# Patient Record
Sex: MALE | Race: OTHER | NOT HISPANIC OR LATINO | Employment: FULL TIME | URBAN - METROPOLITAN AREA
[De-identification: names, ages, dates, MRNs, and addresses within clinical notes are randomized per-mention and may not be internally consistent; named-entity substitution may affect disease eponyms.]

---

## 2019-07-20 DIAGNOSIS — M54.50 LOW BACK PAIN: Primary | ICD-10-CM

## 2019-07-30 PROBLEM — M25.512 PAIN IN JOINT OF LEFT SHOULDER: Status: ACTIVE | Noted: 2019-07-30

## 2019-08-06 ENCOUNTER — CLINICAL SUPPORT (OUTPATIENT)
Dept: REHABILITATION | Facility: HOSPITAL | Age: 44
End: 2019-08-06
Payer: MEDICAID

## 2019-08-06 PROCEDURE — 97110 THERAPEUTIC EXERCISES: CPT

## 2019-08-06 PROCEDURE — 97140 MANUAL THERAPY 1/> REGIONS: CPT

## 2019-08-06 NOTE — PROGRESS NOTES
"  Physical Therapy Daily Treatment Note     Name: Dennis Oden  Clinic Number: [id number]    Therapy Diagnosis: Pain in L shoulder    Physician: Javan Alcocer Jr.,*    Visit Date: 8/6/2019    Physician Orders: eval and treat  Medical Diagnosis: Pain in L shoulder   Evaluation Date: 7/3/19  Authorization Period Expiration:   Plan of Care Certification Period: 7/3/19 - 8/14/19  Visit #/Visits authorized:     Time In:10:00  Time Out: 11:00  Total Billable Time: 60 minutes    Precautions: Standard    Subjective     Pt reports: Minimal pain in L shoulder. "It's not really effecting my life much."   Response to previous treatment: good   Functional change: ADLs without pain.     Pain: 0/10  Location: left shoulder      Objective     Dennis received therapeutic exercises to develop strength, endurance, flexibility and posture for 45 minutes including:    ·   Shoulder - Pulley - Flexion 3 minutes  ·   Shoulder - Pulley - Abduction 3 minutes  ·   Shoulder - Scapula - Ball on Wall (scap Stab) 30 seconds ea: up/down, R/L, CW/CCW  ·   Shoulder - Tband - ROW 2 x 10  ·   Shoulder - Pec Stretch 3x 30 seconds; doorway  ·   C-Spine - Upper Trap  Stretch 3x 30 second holds each  ·   C-Spine - Levator Scap Stretch 3x 30 second holds each  ·   Shoulder - Scapular Retraction 2 x 10 with 5 second holds  ·   Shoulder - Serratus Punch -Supine   2# 2 x 10    · Dennis received the following manual therapy techniques: Joint mobilizations and Soft tissue Mobilization were applied to the: musculature of L shoulder/scapular region for 10 minutes, including:STM to long head of biceps, upper trap rhomboids, suboccipital and cervical paraspinals; Grade III A/P and inferior glide to L GHJ        Dennis received cold pack for 10 minutes to L shoulder following TE and MT.        Assessment     Pt tolerated resuming of TE without onset of painful symptoms. Moderate muscle tightness noted in distal pec region and proximal biceps region. Soft " tissues responded well to manual therapy with improved pliability following STM.      Dennis is progressing well towards his goals.   Pt prognosis is Good.     Pt will continue to benefit from skilled outpatient physical therapy to address the deficits listed in the problem list box on initial evaluation, provide pt/family education and to maximize pt's level of independence in the home and community environment.     Pt's spiritual, cultural and educational needs considered and pt agreeable to plan of care and goals.     Anticipated barriers to physical therapy: none identified     Goals: Short Term Goals  Goal   1. Patient will be independent with HEP to promote improved functional outcomes during therapy in 6 visits.   2. Patient will report decreased pain in L shoulder with the worst pain no more than 5/10 to demonstrate an improved quality of life in 6 visits.   3. Patient will decrease LOLA score from 27% disability to 18% disability for an improvement in functional abilities in 6 visits.     Long Term Goals  Goal   4. Patient will increase strength in L shoulder External rotators, rhomboids, mid and lower traps to 4/5 to demonstrate improved postural control in 12 visits.   5. Patient will report decreased L shoulder pain to </=2/10 at worst to demonstrate an improved quality of life in 12 visits.   6. Patient will decrease LOLA score from 27% disability at IE to </= 10% disability to return to sparring activities in 12 visits.       Plan     Continue current POC advancing as tolerated.     Jazmin Bueno, PTA

## 2019-08-08 ENCOUNTER — CLINICAL SUPPORT (OUTPATIENT)
Dept: REHABILITATION | Facility: HOSPITAL | Age: 44
End: 2019-08-08
Payer: MEDICAID

## 2019-08-08 DIAGNOSIS — M25.512 PAIN IN JOINT OF LEFT SHOULDER: ICD-10-CM

## 2019-08-08 DIAGNOSIS — M25.512 ACUTE PAIN OF LEFT SHOULDER: Primary | ICD-10-CM

## 2019-08-08 PROCEDURE — 97110 THERAPEUTIC EXERCISES: CPT

## 2019-08-08 NOTE — PROGRESS NOTES
Physical Therapy Daily Treatment Note     Name: Dennis Oden  Clinic Number: [id number]    Therapy Diagnosis: Pain in L shoulder    Physician: Javan Alcocer Jr.,*    Visit Date: 8/8/2019    Physician Orders: eval and treat  Medical Diagnosis: Pain in L shoulder   Evaluation Date: 7/3/19  Authorization Period Expiration: TBD  Plan of Care Certification Period: 7/3/19 - 8/14/19  Visit #/Visits authorized: 6/TBD    Monthly    Time In:10:05  Time Out: 11:00  Total Billable Time: 55 minutes    Precautions: Standard    Subjective     Pt reports: no pain in shoulder during his week off from therapy but states after last treatment, he experienced an increase in pain. He states that he can perform his usual activities without issue, difficulty or increase in pain.  He verbalized that he has difficulty quantifying pain and answering Quick DASH with the numerical scale   Response to previous treatment: good but noted some increase in pain after Tuesday's treatment.   Functional change: ADLs without pain.     Pain: 0/10  Location: left shoulder      Objective     Dennis received therapeutic exercises to develop strength, endurance, flexibility and posture for 45 minutes including:    ·   Shoulder - Pulley - Flexion 3 minutes  ·   Shoulder - Pulley - Abduction 3 minutes  ·   Shoulder - Scapula - Ball on Wall (scap Stab) 30 seconds ea: up/down, R/L, CW/CCW  ·   Shoulder - Tband - ROW with hold 2 x 10 with 5 second hold  ·   Shoulder - Pec Stretch 3x 30 seconds; doorway  ·   C-Spine - Upper Trap  Stretch 2x 30 second holds each  ·   C-Spine - Levator Scap Stretch 2x 30 second holds each  ·   Shoulder - Serratus Punch -Supine   3# 2 x 10  Push up plus 2x 10  Posterior capsule stretch 2x 30 seconds  Prayer stretch 2x 30 seconds    Not Performed at today's visit:  Scapular Retraction 2 x 10 with 5 second holds    Dennis received a monthly Reassessment of goals for 10 minutes including MMT, and QuickDASH questionnaire  :  Muscle Right Strength Left Strength   Shoulder Flexors 4/5 4/5 (pain)   Shoulder extensors 5/5 5/5 (Pain)   Shoulder abductors 4/5 4/5 (pain)   Shoulder internal rotators 5/5 5/5   shoulder External Rotators 5/5 5/5   Elbow flexors 5/5 5/5 (pain)   Elbow extensors 4+/5 5/5 (pain)   Middle Trapezius 4/5 4/5   Lower Trapezius 4/5 4/5   Rhomboids 4/5 4/5     Patient improved B internal rotator strength and R ER strength from 4/5 to 5/5 and L external rotator strength from 3+/5 to 5/5 at today's visit.  B Middle trap, lower trap and rhomboids increased from 3/5 to 4/5 at todays visit.     Quick DASH questionnaire provided to pt at IE and today.  Score for today indicated 29.5% disability.    Education was provided on new exercises including posterior capsule stretch, prayer stretch, and push-up plus as well as the treatment and responses from last visit.       Assessment     At monthly re-assessment, patient has made little progress.  He did miss a week of therapy and stated that he felt really good without increase in pain with ADLs and boxing/exercises.  Patient reported his pain at the worst over the past few weeks as 4/10 to meet goal 2 and make progress towards goal 5 as well as increasing MMT strength to meet goal 4. . Patient states he did not receive a copy of stretches and exercises to perform at home and was provided with one at today's visit to address goal 1.  Patient verbalized difficulty with quantifying pain and filling out QuickDASH as they relate to using a numerical scale which may be why patient's score increased at today's visit from 27% disability at IE to 29.5% disability.  When the scale was explained to him, he stated that he doesn't have difficulty completing tasks and activities but sometimes he experiences pain when he performs them.       Dennis is progressing well towards his goals.   Pt prognosis is Good.     Pt will continue to benefit from skilled outpatient physical therapy to  address the deficits listed in the problem list box on initial evaluation, provide pt/family education and to maximize pt's level of independence in the home and community environment.     Pt's spiritual, cultural and educational needs considered and pt agreeable to plan of care and goals.     Anticipated barriers to physical therapy: patient has difficulty with verbally quantifying pain and activity performance limitations as a result of pain.      Goals:     Length Status Goal   Short Term 08/08/2019: In progress 1. Patient will be independent with HEP to promote improved functional outcomes during therapy in 6 visits.   Short Term 08/08/2019: MET 2. Patient will report decreased pain in L shoulder with the worst pain no more than 5/10 to demonstrate an improved quality of life in 6 visits.   Short Term 08/08/2019: Addressed 3. Patient will decrease Quick DASH score from 27% disability to 18% disability for an improvement in functional abilities in 6 visits.   Long Term 08/08/2019: MET 4. Patient will increase strength in L shoulder External rotators, rhomboids, mid and lower traps to 4/5 to demonstrate improved postural control in 12 visits.   Long Term 08/08/2019:In progress 5. Patient will report decreased L shoulder pain to </=2/10 at worst to demonstrate an improved quality of life in 12 visits.   Long Term 08/08/2019: In progress 6. Patient will decrease Quick DASH score from 27% disability at IE to </= 10% disability to return to sparring activities in 12 visits.         Plan     Continue current POC advancing as tolerated.     Muna Salgado, PT

## 2019-08-08 NOTE — PATIENT INSTRUCTIONS
PT Home Exercise Plan  VIANCA PRATER  Report Generation Date: 07/16/2019    Upper Trap Stretch Levator Scapula Stretch Scapular Retractions        Gently grasp side of head while reaching behind back with other hand.  Tilt head away until a gentle stretch is felt.  Hold ___ seconds.  Repeat ___ times, both sides.  DO ___ times per day. Place hand on same side of shoulder blade.  With other hand gently stretch head down and away.  Hold ___ seconds.  Repeat ____ reps.  Do ___ sets.  Do ___ sessions per day. Squeeze shoulder blades together.  Hold ___ seconds.  Repeat ___ times.  Do ___ sets. Perform __ sessions per day.   Hold 10 seconds repeat 5 times Hold 10 seconds repeat 5 times. 2 x 10

## 2019-08-13 ENCOUNTER — CLINICAL SUPPORT (OUTPATIENT)
Dept: REHABILITATION | Facility: HOSPITAL | Age: 44
End: 2019-08-13
Payer: MEDICAID

## 2019-08-13 DIAGNOSIS — M25.512 PAIN IN JOINT OF LEFT SHOULDER: Primary | ICD-10-CM

## 2019-08-13 PROCEDURE — 97110 THERAPEUTIC EXERCISES: CPT

## 2019-08-13 NOTE — PROGRESS NOTES
Physical Therapy Daily Treatment Note     Name: Dennis Oden  Clinic Number: [id number]    Therapy Diagnosis: Pain in L shoulder    Physician: Javan Alcocer Jr.,*    Visit Date: 8/13/2019    Physician Orders: eval and treat  Medical Diagnosis: Pain in L shoulder   Evaluation Date: 7/3/19  Authorization Period Expiration: TBD  Plan of Care Certification Period: 7/3/19 - 8/14/19  Visit #/Visits authorized: 7/TBD    Time In:10:00 am  Time Out: 11:10 am  Total Billable Time: 60 minutes    Precautions: Standard    Subjective     Pt reports: sometimes he feels the pain but not all the time.   Response to previous treatment: no complaints after last treatment  Functional change: ADLs without pain.     Pain: 0/10  Location: left shoulder      Objective     Dennis received therapeutic exercises to develop strength, endurance, flexibility and posture for 58 minutes including:  Pulley - flexion 3 mins  Pulley abduction 3 minutes  Ball on wall (scap satbilization) - 2x 30 seconds each: up/craig, R/L, CW/CWW  Cybex Lat Pull down 2x 10 with 5 plates  Cybex Row 2x 10 with 4 plates  I, T, Y's with pink theraband 1x 10  Push up plus 3x 10  Pec Stretch (doorway) 3x 30 second holds  Shoulder - Serratus Punch -Supine   3# 3x 10  Posterior capsule stretch 2x 30 seconds  Prayer stretch 3x 30 seconds  UBE 6 minutes    Not Performed at today's visit:  Scapular Retraction 2 x 10 with 5 second holds  Tband - ROW with hold 2 x 10 with 5 second hold  Levator scap stretch 2x 30 second hold  Upper trap 2x 30 second hold  (add) flexion in standing/supine with weight  (add) ER in side lying with weight      Patient received MHP for 10 minutes to L shoulder for pain relief after treatment in sitting.     Education was provided on new exercises.     Assessment     Patient continues to note decreased pain with activities. He has difficulty verbalizing pain on numerical scale.  Minimal cues for prayer stretch and serratus punches at today's  visit.    Patient tolerated all new exercises well today without incident but required tactile cueing for correct performance of I, T, Y's to target appropriate muscles.       Dennis is progressing well towards his goals.   Pt prognosis is Good.     Pt will continue to benefit from skilled outpatient physical therapy to address the deficits listed in the problem list box on initial evaluation, provide pt/family education and to maximize pt's level of independence in the home and community environment.     Pt's spiritual, cultural and educational needs considered and pt agreeable to plan of care and goals.     Anticipated barriers to physical therapy: patient has difficulty with verbally quantifying pain and activity performance limitations as a result of pain.      Goals:     Length Status Goal   Short Term 08/08/2019: In progress 1. Patient will be independent with HEP to promote improved functional outcomes during therapy in 6 visits.   Short Term 08/08/2019: MET 2. Patient will report decreased pain in L shoulder with the worst pain no more than 5/10 to demonstrate an improved quality of life in 6 visits.   Short Term 08/08/2019: Addressed 3. Patient will decrease Quick DASH score from 27% disability to 18% disability for an improvement in functional abilities in 6 visits.   Long Term 08/08/2019: MET 4. Patient will increase strength in L shoulder External rotators, rhomboids, mid and lower traps to 4/5 to demonstrate improved postural control in 12 visits.   Long Term 08/08/2019:In progress 5. Patient will report decreased L shoulder pain to </=2/10 at worst to demonstrate an improved quality of life in 12 visits.   Long Term 08/08/2019: In progress 6. Patient will decrease Quick DASH score from 27% disability at IE to </= 10% disability to return to sparring activities in 12 visits.         Plan     Continue current POC advancing as tolerated. At next visit, look at adding theraband for resistance with push  up plus exercise as well as new exercises for flexion and ER.     Muna Salgado, PT

## 2019-08-15 ENCOUNTER — CLINICAL SUPPORT (OUTPATIENT)
Dept: REHABILITATION | Facility: HOSPITAL | Age: 44
End: 2019-08-15
Payer: MEDICAID

## 2019-08-15 DIAGNOSIS — M25.512 PAIN IN JOINT OF LEFT SHOULDER: ICD-10-CM

## 2019-08-15 PROCEDURE — 97110 THERAPEUTIC EXERCISES: CPT

## 2019-08-15 NOTE — PROGRESS NOTES
Physical Therapy Daily Treatment Note     Name: Dennis Oden  Clinic Number: [id number]    Therapy Diagnosis: Pain in L shoulder    Physician: Javan Alcocer Jr.,*    Visit Date: 8/15/2019    Physician Orders: eval and treat  Medical Diagnosis: Pain in L shoulder   Evaluation Date: 7/3/19  Authorization Period Expiration: TBD  Plan of Care Certification Period: 7/3/19 - 8/14/19  Visit #/Visits authorized: 8/TBD    Time In:10:00 am  Time Out: 11:10 am  Total Billable Time: 60 minutes    Precautions: Standard    Subjective     Pt reports: no pain this week.   Response to previous treatment: no complaints after last treatment  Functional change: ADLs without pain.     Pain: 0/10  Location: left shoulder      Objective     Dennis received therapeutic exercises to develop strength, endurance, flexibility and posture for 60 minutes including:  UBE 6 minutes   Pulley - flexion 3 mins  Pulley abduction 3 minutes  Ball on wall (scap satbilization) - 1x 1 minute each: up/craig, R/L, CW/CWW  Cybex Lat Pull down wide and reverse  3x 10 with 5 plates  Cybex Row 2x 10 with 4 plates  I, T, Y's with pink theraband 1x 10  Sidelying ER with 2# weight 2x 10  Flexion in supine with 2# weight 2x 10    Not Performed at today's visit:  Scapular Retraction 2 x 10 with 5 second holds  Tband - ROW with hold 2 x 10 with 5 second hold  Levator scap stretch 2x 30 second hold  Upper trap 2x 30 second hold  Pec Stretch (doorway) 3x 30 second holds      Patient received MHP for 10 minutes to L shoulder for pain relief after treatment in sitting.     Education was provided on new exercises, d/c, compliance with HEP after d/c and heating pad for home use.     Assessment     At last monthly reassessment and today, patient has met goals 1, 2, 4, and 5 while some progress was made towards goals 3 and 6.  He does have difficulty quanitfying pain as it relates to his shoulder and activities which may be one reason goals 3 and 4 were not met.   Patient continues to progress well with exercises and has no complaints of pain with previous exercises.  He tolerated all new exercises well noting muscle soreness on R with external rotation but none of his pain in the L.  Cueing for decreasing upper trap compensation is minimal and patient is able to self correct.  He notes his shoulder is not bothering him all the time/every day.  He does state he notices more pain when he is cold and at night but it is relieved when he gets warm.  He was educated on continued performance of HEP as well as using a heating pad at home over his shoulder when he does feel pain. Patient is discharged at today's visit as all goals were re-assessed last week and patient is not having difficulty with any of his daily activities, workouts or job responsibilities.       Dennis is progressing well towards his goals.   Pt prognosis is Good.       Pt's spiritual, cultural and educational needs considered and pt agreeable to plan of care and goals.     Anticipated barriers to physical therapy: patient has difficulty with verbally quantifying pain and activity performance limitations as a result of pain.      Goals:     Length Status Goal   Short Term 08/15/2019: MET 1. Patient will be independent with HEP to promote improved functional outcomes during therapy in 6 visits.   Short Term 08/08/2019: MET 2. Patient will report decreased pain in L shoulder with the worst pain no more than 5/10 to demonstrate an improved quality of life in 6 visits.   Short Term 08/08/2019: Some Progress 3. Patient will decrease Quick DASH score from 27% disability to 18% disability for an improvement in functional abilities in 6 visits.   Long Term 08/08/2019: MET 4. Patient will increase strength in L shoulder External rotators, rhomboids, mid and lower traps to 4/5 to demonstrate improved postural control in 12 visits.   Long Term 08/15/2019: MET 5. Patient will report decreased L shoulder pain to </=2/10 at  worst to demonstrate an improved quality of life in 12 visits.   Long Term 08/08/2019: Some progress 6. Patient will decrease Quick DASH score from 27% disability at IE to </= 10% disability to return to sparring activities in 12 visits.         Plan     Patient is discharged at today's visit at pt request and due to no pain/difficulty with ADLs and work activities and meeting many of his goals.  He is discharged with verbal confirmation to continue with HEP.      Muna Salgado, PT

## 2019-10-22 ENCOUNTER — HOSPITAL ENCOUNTER (EMERGENCY)
Facility: HOSPITAL | Age: 44
Discharge: HOME | End: 2019-10-22
Attending: EMERGENCY MEDICINE
Payer: MEDICAID

## 2019-10-22 VITALS
OXYGEN SATURATION: 99 % | TEMPERATURE: 98 F | WEIGHT: 195 LBS | RESPIRATION RATE: 20 BRPM | HEIGHT: 68 IN | HEART RATE: 60 BPM | DIASTOLIC BLOOD PRESSURE: 76 MMHG | BODY MASS INDEX: 29.55 KG/M2 | SYSTOLIC BLOOD PRESSURE: 123 MMHG

## 2019-10-22 DIAGNOSIS — R07.9 CHEST PAIN: ICD-10-CM

## 2019-10-22 DIAGNOSIS — M54.6 ACUTE BILATERAL THORACIC BACK PAIN: Primary | ICD-10-CM

## 2019-10-22 DIAGNOSIS — M54.9 BACK PAIN: ICD-10-CM

## 2019-10-22 LAB
ANION GAP SERPL CALC-SCNC: 7 MMOL/L (ref 8–16)
BASOPHILS # BLD AUTO: 0.05 K/UL (ref 0–0.2)
BASOPHILS NFR BLD: 0.9 % (ref 0–1.9)
BUN SERPL-MCNC: 21 MG/DL (ref 6–20)
CALCIUM SERPL-MCNC: 9.4 MG/DL (ref 8.7–10.5)
CHLORIDE SERPL-SCNC: 108 MMOL/L (ref 95–110)
CK SERPL-CCNC: 415 U/L (ref 20–200)
CO2 SERPL-SCNC: 24 MMOL/L (ref 23–29)
CREAT SERPL-MCNC: 0.9 MG/DL (ref 0.5–1.4)
DIFFERENTIAL METHOD: ABNORMAL
EOSINOPHIL # BLD AUTO: 0.4 K/UL (ref 0–0.5)
EOSINOPHIL NFR BLD: 6.6 % (ref 0–8)
ERYTHROCYTE [DISTWIDTH] IN BLOOD BY AUTOMATED COUNT: 11.9 % (ref 11.5–14.5)
ERYTHROCYTE [SEDIMENTATION RATE] IN BLOOD BY WESTERGREN METHOD: 12 MM/HR (ref 0–10)
EST. GFR  (AFRICAN AMERICAN): >60 ML/MIN/1.73 M^2
EST. GFR  (NON AFRICAN AMERICAN): >60 ML/MIN/1.73 M^2
GLUCOSE SERPL-MCNC: 109 MG/DL (ref 70–110)
HCT VFR BLD AUTO: 38.5 % (ref 40–54)
HGB BLD-MCNC: 13.5 G/DL (ref 14–18)
IMM GRANULOCYTES # BLD AUTO: 0 K/UL (ref 0–0.04)
IMM GRANULOCYTES NFR BLD AUTO: 0 % (ref 0–0.5)
LYMPHOCYTES # BLD AUTO: 2.4 K/UL (ref 1–4.8)
LYMPHOCYTES NFR BLD: 41.8 % (ref 18–48)
MCH RBC QN AUTO: 31.3 PG (ref 27–31)
MCHC RBC AUTO-ENTMCNC: 35.1 G/DL (ref 32–36)
MCV RBC AUTO: 89 FL (ref 82–98)
MONOCYTES # BLD AUTO: 0.6 K/UL (ref 0.3–1)
MONOCYTES NFR BLD: 10.6 % (ref 4–15)
NEUTROPHILS # BLD AUTO: 2.3 K/UL (ref 1.8–7.7)
NEUTROPHILS NFR BLD: 40.1 % (ref 38–73)
NRBC BLD-RTO: 0 /100 WBC
PLATELET # BLD AUTO: 223 K/UL (ref 150–350)
PMV BLD AUTO: 10.3 FL (ref 9.2–12.9)
POTASSIUM SERPL-SCNC: 3.9 MMOL/L (ref 3.5–5.1)
RBC # BLD AUTO: 4.32 M/UL (ref 4.6–6.2)
SODIUM SERPL-SCNC: 139 MMOL/L (ref 136–145)
TROPONIN I SERPL DL<=0.01 NG/ML-MCNC: <0.03 NG/ML (ref 0.02–0.04)
WBC # BLD AUTO: 5.64 K/UL (ref 3.9–12.7)

## 2019-10-22 PROCEDURE — 25000003 PHARM REV CODE 250: Performed by: EMERGENCY MEDICINE

## 2019-10-22 PROCEDURE — 85025 COMPLETE CBC W/AUTO DIFF WBC: CPT

## 2019-10-22 PROCEDURE — 93005 ELECTROCARDIOGRAM TRACING: CPT

## 2019-10-22 PROCEDURE — 36415 COLL VENOUS BLD VENIPUNCTURE: CPT

## 2019-10-22 PROCEDURE — 80048 BASIC METABOLIC PNL TOTAL CA: CPT

## 2019-10-22 PROCEDURE — 96372 THER/PROPH/DIAG INJ SC/IM: CPT | Mod: 59

## 2019-10-22 PROCEDURE — 82550 ASSAY OF CK (CPK): CPT

## 2019-10-22 PROCEDURE — 63600175 PHARM REV CODE 636 W HCPCS: Performed by: EMERGENCY MEDICINE

## 2019-10-22 PROCEDURE — 99285 EMERGENCY DEPT VISIT HI MDM: CPT | Mod: 25

## 2019-10-22 PROCEDURE — 85651 RBC SED RATE NONAUTOMATED: CPT

## 2019-10-22 PROCEDURE — 84484 ASSAY OF TROPONIN QUANT: CPT

## 2019-10-22 RX ORDER — METHOCARBAMOL 500 MG/1
1000 TABLET, FILM COATED ORAL 4 TIMES DAILY
Status: DISCONTINUED | OUTPATIENT
Start: 2019-10-22 | End: 2019-10-22 | Stop reason: HOSPADM

## 2019-10-22 RX ORDER — KETOROLAC TROMETHAMINE 10 MG/1
10 TABLET, FILM COATED ORAL EVERY 6 HOURS
Qty: 20 TABLET | Refills: 0 | Status: SHIPPED | OUTPATIENT
Start: 2019-10-22 | End: 2022-04-05 | Stop reason: CLARIF

## 2019-10-22 RX ORDER — KETOROLAC TROMETHAMINE 30 MG/ML
30 INJECTION, SOLUTION INTRAMUSCULAR; INTRAVENOUS
Status: COMPLETED | OUTPATIENT
Start: 2019-10-22 | End: 2019-10-22

## 2019-10-22 RX ORDER — METHOCARBAMOL 500 MG/1
1000 TABLET, FILM COATED ORAL 3 TIMES DAILY
Qty: 30 TABLET | Refills: 0 | Status: SHIPPED | OUTPATIENT
Start: 2019-10-22 | End: 2019-10-22 | Stop reason: SDUPTHER

## 2019-10-22 RX ORDER — KETOROLAC TROMETHAMINE 10 MG/1
10 TABLET, FILM COATED ORAL EVERY 6 HOURS
Qty: 20 TABLET | Refills: 0 | Status: SHIPPED | OUTPATIENT
Start: 2019-10-22 | End: 2019-10-22 | Stop reason: SDUPTHER

## 2019-10-22 RX ORDER — METHOCARBAMOL 500 MG/1
1000 TABLET, FILM COATED ORAL 3 TIMES DAILY
Qty: 30 TABLET | Refills: 0 | Status: SHIPPED | OUTPATIENT
Start: 2019-10-22 | End: 2019-10-29

## 2019-10-22 RX ADMIN — METHOCARBAMOL 1000 MG: 500 TABLET, FILM COATED ORAL at 07:10

## 2019-10-22 RX ADMIN — KETOROLAC TROMETHAMINE 30 MG: 30 INJECTION, SOLUTION INTRAMUSCULAR at 07:10

## 2019-10-22 NOTE — ED PROVIDER NOTES
Encounter Date: 10/22/2019       History     Chief Complaint   Patient presents with    Back Pain     Male who has a benign past medical history and was on no medication, presents emergency room with complaints of having thoracic back pain that began yesterday morning.  The patient denies any direct trauma or heavy lifting but did admit to dancing the night before the onset of symptoms at a wedding.  He has had no coughing, shortness of breath or fever.  No complaints of any anterior chest pain.  He states that his back pain is worse with movement.  He has no cervical pain or lumbar pain. No complaints of any nausea vomiting        Review of patient's allergies indicates:  No Known Allergies  No past medical history on file.  No past surgical history on file.  No family history on file.  Social History     Tobacco Use    Smoking status: Never Smoker    Smokeless tobacco: Never Used   Substance Use Topics    Alcohol use: Not on file    Drug use: Not on file     Review of Systems   Constitutional: Positive for activity change. Negative for appetite change, diaphoresis and fever.   HENT: Negative for congestion, sinus pressure and sinus pain.    Eyes: Negative.    Respiratory: Negative for cough, chest tightness, shortness of breath, wheezing and stridor.    Cardiovascular: Negative for chest pain and palpitations.   Gastrointestinal: Negative for abdominal pain, constipation, diarrhea, nausea and vomiting.   Genitourinary: Negative for difficulty urinating.   Musculoskeletal: Positive for back pain and myalgias. Negative for joint swelling, neck pain and neck stiffness.   Skin: Negative for rash.   Neurological: Negative for dizziness, syncope and headaches.   All other systems reviewed and are negative.      Physical Exam     Initial Vitals [10/22/19 0629]   BP Pulse Resp Temp SpO2   (!) 162/88 71 16 97.9 °F (36.6 °C) 100 %      MAP       --         Physical Exam    Constitutional: He appears well-developed and  well-nourished. He is not diaphoretic. No distress.   HENT:   Head: Normocephalic and atraumatic.   Nose: Nose normal.   Mouth/Throat: Oropharynx is clear and moist.   Eyes: Conjunctivae and EOM are normal. Pupils are equal, round, and reactive to light. No scleral icterus.   Neck: Normal range of motion. Neck supple. No tracheal deviation present. No JVD present.   Cardiovascular: Normal rate, regular rhythm, normal heart sounds and intact distal pulses. Exam reveals no gallop and no friction rub.    No murmur heard.  Pulmonary/Chest: Breath sounds normal. No respiratory distress. He has no wheezes. He has no rhonchi. He has no rales.   Paravertebral muscle tenderness   Abdominal: Soft. Bowel sounds are normal. He exhibits no distension and no mass. There is no tenderness. There is no rebound and no guarding.   Musculoskeletal: Normal range of motion. He exhibits no edema or tenderness.   No posterior rib tenderness to palpation   Lymphadenopathy:     He has no cervical adenopathy.   Neurological: He is alert and oriented to person, place, and time. He has normal strength. No cranial nerve deficit or sensory deficit. GCS score is 15. GCS eye subscore is 4. GCS verbal subscore is 5. GCS motor subscore is 6.   Skin: Skin is warm and dry. Capillary refill takes less than 2 seconds. No rash noted. No erythema. No pallor.   Psychiatric: He has a normal mood and affect. His behavior is normal. Judgment and thought content normal.         ED Course   Procedures  Labs Reviewed   CBC W/ AUTO DIFFERENTIAL - Abnormal; Notable for the following components:       Result Value    RBC 4.32 (*)     Hemoglobin 13.5 (*)     Hematocrit 38.5 (*)     Mean Corpuscular Hemoglobin 31.3 (*)     All other components within normal limits   SEDIMENTATION RATE   BASIC METABOLIC PANEL   TROPONIN I   CK          Imaging Results          X-Ray Chest PA And Lateral (Final result)  Result time 10/22/19 07:35:02    Final result by Javan HARRIS  MD Que (10/22/19 07:35:02)                 Impression:      No acute cardiac or pulmonary process.      Electronically signed by: Javan Grey MD  Date:    10/22/2019  Time:    07:35             Narrative:    CLINICAL HISTORY:  ([id number])45 y/o  (1975) M    Chest pain, unspecified    TECHNIQUE:  (A#[id number], exam time 10/22/2019 7:34)    XR CHEST PA AND LATERAL [id number]    COMPARISON:  None available.    FINDINGS:  The lungs are clear. Costophrenic angles are seen without effusion. No pneumothorax is identified. The heart is normal in size. The mediastinum is within normal limits. Osseous structures appear within normal limits. The visualized upper abdomen is unremarkable.                                              Attending Attestation:             Attending ED Notes:   This 44-year-old male with a benign past medical history and what appears to be muscular thoracic back pain, was given Toradol and Robaxin with improvement in symptoms. The patient's EKG at this time shows a septal scar but no evidence of any acute ischemia or ectopy.  He will be able to be discharged to continue NSAIDs and muscle relaxers advised to rest with recommended follow-up by his primary physician.             Clinical Impression:       ICD-10-CM ICD-9-CM   1. Acute bilateral thoracic back pain M54.6 724.1   2. Chest pain R07.9 786.50   3. Back pain M54.9 724.5                                Juancho Chi Jr., MD  10/22/19 0915       Juancho Chi Jr., MD  10/22/19 0917

## 2020-11-11 DIAGNOSIS — G56.02 LEFT CARPAL TUNNEL SYNDROME: Primary | ICD-10-CM

## 2020-11-19 DIAGNOSIS — Z98.890 S/P CARPAL TUNNEL RELEASE: Primary | ICD-10-CM

## 2020-11-19 DIAGNOSIS — G56.02 CARPAL TUNNEL SYNDROME, LEFT: ICD-10-CM

## 2020-12-03 ENCOUNTER — CLINICAL SUPPORT (OUTPATIENT)
Dept: REHABILITATION | Facility: HOSPITAL | Age: 45
End: 2020-12-03
Payer: MEDICAID

## 2020-12-03 DIAGNOSIS — M25.642 STIFFNESS OF LEFT HAND JOINT: ICD-10-CM

## 2020-12-03 DIAGNOSIS — M25.542 PAIN IN JOINT OF LEFT HAND: Primary | ICD-10-CM

## 2020-12-03 DIAGNOSIS — M25.442 EFFUSION OF JOINT OF LEFT HAND: ICD-10-CM

## 2020-12-03 DIAGNOSIS — R29.898 LEFT HAND WEAKNESS: ICD-10-CM

## 2020-12-03 DIAGNOSIS — Z98.890 S/P CARPAL TUNNEL RELEASE: ICD-10-CM

## 2020-12-03 PROCEDURE — 97165 OT EVAL LOW COMPLEX 30 MIN: CPT | Mod: PN

## 2020-12-03 NOTE — PLAN OF CARE
[facility] Occupational Therapy  Initial Evaluation     Date: 12/3/2020  Name: Dennis Oden  Clinic Number: [id number]    Therapy Diagnosis:   Encounter Diagnoses   Name Primary?    S/P carpal tunnel release     Pain in joint of left hand Yes    Effusion of joint of left hand     Stiffness of left hand joint     Left hand weakness      Physician: Dami Campoverde MD    Physician Orders: evaluate and tx, see epic  Medical Diagnosis: left carpal tunnel release  Surgical Procedure and Date: left carpal tunnel release, roughly 2 months ago per patient  Evaluation Date: 12/3/2020  Insurance Authorization Period Expiration: referral # [id number] 11-11-20 thru   Plan of Care Certification Period: 12-3-20 thru 12-31-20  Date of Return to MD: 12-9-20    Visit # / Visits authorized: 1 / 1  Time In:1145 (running late for visit)  Time Out: 12  Total Billable Time: 0 minutes    Precautions:  universal  Subjective     Involved Side: left  Dominant Side: right  Date of Onset: about 6-7 months pre surgery  History of Current Condition/Mechanism of Injury: insidious  Imaging: unknown  Previous Therapy: none    Past Medical History/Physical Systems Review:   Dennis Oden  has no past medical history on file.    Dennis Oden  has no past surgical history on file.    Dennis has a current medication list which includes the following prescription(s): ketorolac.    Review of patient's allergies indicates:  No Known Allergies     Patient's Goals for Therapy: full painless use    Pain:  Functional Pain Scale Rating 0-10:   2/10 on average  0/10 at best  5/10 at worst  Location: diffuse thru volar wrist and distal volar forearm  Description: ache  Aggravating Factors: use  Easing Factors: rest  Occupation:    Working presently: yes  Duties: per job; Normal self and home care tasks    Functional Limitations/Social History:    Previous functional status includes: Independent with all ADLs.      Current Functional Status   Home/Living environment : lives with spouse    Limitation of Functional Status as follows: decreased motion, strength, and overall use with required tasks   ADLs/IADLs:               See above   Leisure: not tested  pain meds: denies  nicotine: denies  caffeine: 2 cups of regular coffee daily    Objective   Posture:healed incision per surgery  Palpation:nt  Sensation:denies burning, numbness, tingling  ROM: full prom wrist and hand except df 60 on left vs 80 on right and intrinsic stretch 0 cm to A1 on righ vs 1 cm on left     Edema:circumferential     Wrist right 18.0 cm left 18.4 cm          CMS Impairment/Limitation/Restriction for FOTO  Survey    Therapist reviewed FOTO scores for Dennis Oden on 12/3/2020.   FOTO documents entered into [employer] - see Media section.    Limitation Score: 57%  Category: Carrying    Current : CK = at least 40% but < 60% impaired, limited or restricted  Goal: CJ = at least 20% but < 40% impaired, limited or restricted               Treatment                   Home Exercise Program/Education:  Issued HEP (see patient instructions in EMR) . Exercises were reviewed and Dennis was able to demonstrate them prior to the end of the session.   Pt received a written copy of exercises to perform at home. Dennis demonstrated good understanding of the education provided.  Pt was advised to perform these exercises free of pain, and to stop performing them if pain occurs.    Patient/Family Education: role of OT, goals for OT, scheduling/cancellations - pt verbalized understanding. Discussed insurance limitations with patient.    Additional Education provided: likely tx progression, expectations of rehab    Assessment     Dennis Oden is a 45 y.o. male referred to outpatient occupational therapy and presents with a medical diagnosis of see above, resulting in Decreased ROM, Decreased functional hand use, Increased pain, Edema, Joint Stiffness and Scar Adhesions  and demonstrates limitations as described in the chart below. Following medical record review it is determined that pt will benefit from occupational therapy services in order to maximize pain free and/or functional use of left hand. The following goals were discussed with the patient and patient is in agreement with them as to be addressed in the treatment plan. The patient's rehab potential is good.     Anticipated barriers to occupational therapy: edema, pain, scar, stiffness, weakness  Pt has no cultural, educational or language barriers to learning provided.    Profile and History Assessment of Occupational Performance Level of Clinical Decision Making Complexity Score   Occupational Profile:   Dennis Oden is a 45 y.o. male who lives with their spouse and is currently employed Dennis Oden has difficulty with  ADLs and IADLs as listed previously, which  affecting his/her daily functional abilities.      Comorbidities:    has no past medical history on file.    Medical and Therapy History Review:   Brief               Performance Deficits    Physical:  Joint Mobility  Muscle Power/Strength  Skin Integrity/Scar Formation  Edema  Pain    Cognitive:  No Deficits    Psychosocial:    No Deficits     Clinical Decision Making:  low    Assessment Process:  Problem-Focused Assessments    Modification/Need for Assistance:  Not Necessary    Intervention Selection:  Limited Treatment Options       low  Based on PMHX, co morbidities , data from assessments and functional level of assistance required with task and clinical presentation directly impacting function.           The following goals were discussed with the patient and patient is in agreement with them as to be addressed in the treatment plan.     Goals:   stg to be met in 2 weeks 1. Patient will be I with HEP 2. Patient will have 2/10 pain with light use 3. Patient will have = prom of bilateral wrist and digits to enhance affected arm use with ADL      ltg to  be met by d/c 1. Patient will be I with d/c HEP 2. Patient will have 1/10 pain with all use 3. Patient will have about 75% /pinch  on affected side vs unaffected side to promote full functional use                                                                   4. Patient will be I with all ADL      all goals ongoing unless otherwise noted      Plan   Certification Period/Plan of care expiration: 12/3/2020 to 12-31-20.    Outpatient Occupational Therapy 2 times weekly for 4 weeks to include the following interventions: eval and tx    Above frequency and duration in above dates may change based on patient progress and need for therapy    Chaitanya ESPINAL CHT      I certify the need for the services furnished under this plan of care.    doctor's signature:______________________________________________________

## 2020-12-03 NOTE — PATIENT INSTRUCTIONS
current home program 12-3-20  -use hand for light painless nonrepetitive use only   -gently massage scar with lotion at least 5 minutes 4 x day  -do below exercises 10 times, 6 x day; medium speed, medium force    -do the above finger exercises on the long finger

## 2020-12-14 ENCOUNTER — CLINICAL SUPPORT (OUTPATIENT)
Dept: REHABILITATION | Facility: HOSPITAL | Age: 45
End: 2020-12-14
Payer: MEDICAID

## 2020-12-14 DIAGNOSIS — R29.898 LEFT HAND WEAKNESS: ICD-10-CM

## 2020-12-14 DIAGNOSIS — M25.442 EFFUSION OF JOINT OF LEFT HAND: ICD-10-CM

## 2020-12-14 DIAGNOSIS — M25.642 STIFFNESS OF LEFT HAND JOINT: ICD-10-CM

## 2020-12-14 DIAGNOSIS — M25.542 PAIN IN JOINT OF LEFT HAND: Primary | ICD-10-CM

## 2020-12-14 PROCEDURE — 97110 THERAPEUTIC EXERCISES: CPT | Mod: PN

## 2020-12-14 PROCEDURE — 97022 WHIRLPOOL THERAPY: CPT | Mod: PN

## 2020-12-14 NOTE — PROGRESS NOTES
Occupational Therapy Daily Treatment Note     Date: 12/14/2020  Name: Dennis Oden  Clinic Number: [id number]    Therapy Diagnosis:   Encounter Diagnoses   Name Primary?    Pain in joint of left hand Yes    Effusion of joint of left hand     Stiffness of left hand joint     Left hand weakness      Physician: Dami Campoverde MD    Physician Orders: evaluate and tx, see epic  Medical Diagnosis: left carpal tunnel release  Surgical Procedure and Date: left carpal tunnel release, roughly 2 months ago per patient  Evaluation Date: 12/3/2020  Insurance Authorization Period Expiration: referral # [id number] 12-14-20 thru 2-12-21  Plan of Care Certification Period: 12-3-20 thru 12-31-20  Date of Return to MD: see epic    Visit # / Visits authorized: 1 / 8 referral # [id number] 12-14-20 thru 2-12-21  Time In:1045  Time Out: 1130  Total Billable Time: 45 minutes    Precautions:  universal    Subjective     Pt reports: no new issues  he is compliant with home exercise program.  Response to previous treatment:good  Functional change: light use slowly increasing day to day with basic ADL    Pain: 0/10 rest; up to 5/10 light use  Location: diffuse      volar ache at wrist  Objective       Timed units:  2 therapeutic exercise                            Time:    Untimed units:  fluidotherapy                           Time:1045-11      Measurements: prom df 80 bilaterally        Fluido: 15'  U/s:n/a      UBE: n/a    Scar massage: reviewed    Stretches as tolerated-pain free: n/a      Manual: n/a    ROM: n/a    PRE: light grippers x 10, red flex bar smiles/frowns 30 each; tan putty key, 3 jaw, tip 30 each      HEP: see below    Home Exercises and Education Provided     Education provided:     progress towards goals   likely tx progression  rationale of rehab interventions    Written Home Exercises/Program Provided: explained some basics of a home desensitization routine          Previously issued exercises were reviewed  if still part of current tx plan as well as any issued today and Dennis was able to demonstrate them prior to the end of the session.  Dennis demonstrated good understanding of the HEP provided.     See EMR under patient instructions and/or media for HEP issued today or in past    Assessment     No stiffness left wrist vs right,  and pinch PRE may assist to improve use of hand          Pt would continue to benefit from skilled OT in order to facilitate full functional use long term.    Dennis is progressing well towards his goals and there are no updates to goals at this time. Pt prognosis is good  Pt will continue to benefit from skilled outpatient occupational therapy to address the deficits listed in the problem list on initial evaluation to provide pt/family education and to maximize pt's level of independence in the home and community environment.     Anticipated barriers to occupational therapy: edema, pain, stiffness, scar, weakness    Pt's spiritual, cultural and educational needs considered and pt agreeable to plan of care and goals.    Goals:  stg to be met in 2 weeks 1. Patient will be I with HEP 2. Patient will have 2/10 pain with light use 3. Patient will have = prom of bilateral wrist and digits to enhance affected arm use with ADL        ltg to be met by d/c 1. Patient will be I with d/c HEP 2. Patient will have 1/10 pain with all use 3. Patient will have about 75% /pinch  on affected side vs unaffected side to promote full functional use                                                                   4. Patient will be I with all ADL       all goals ongoing unless otherwise noted              Any goals met today ?  (if any met see above)    Updates/Grading for next session: prn, consider  and pinch testing    Plan: edema control, scar management, ROM, PRE, desensitization, patient education, HEP      Chaitanya Everett OT/CHT

## 2020-12-17 ENCOUNTER — CLINICAL SUPPORT (OUTPATIENT)
Dept: REHABILITATION | Facility: HOSPITAL | Age: 45
End: 2020-12-17
Payer: MEDICAID

## 2020-12-17 DIAGNOSIS — M25.642 STIFFNESS OF LEFT HAND JOINT: ICD-10-CM

## 2020-12-17 DIAGNOSIS — M25.442 EFFUSION OF JOINT OF LEFT HAND: ICD-10-CM

## 2020-12-17 DIAGNOSIS — R29.898 LEFT HAND WEAKNESS: ICD-10-CM

## 2020-12-17 DIAGNOSIS — M25.542 PAIN IN JOINT OF LEFT HAND: Primary | ICD-10-CM

## 2020-12-17 PROCEDURE — 97530 THERAPEUTIC ACTIVITIES: CPT | Mod: PN

## 2020-12-17 NOTE — PROGRESS NOTES
Occupational Therapy Daily Treatment Note     Date: 12/17/2020  Name: Dennis Oden  Clinic Number: [id number]    Therapy Diagnosis:   Encounter Diagnoses   Name Primary?    Pain in joint of left hand Yes    Effusion of joint of left hand     Stiffness of left hand joint     Left hand weakness      Physician: Dami Campoverde MD    Physician Orders: evaluate and tx, see epic  Medical Diagnosis: left carpal tunnel release  Surgical Procedure and Date: left carpal tunnel release, roughly 2 months ago per patient  Evaluation Date: 12/3/2020  Insurance Authorization Period Expiration: referral # [id number] 12-14-20 thru 2-12-21  Plan of Care Certification Period: 12-3-20 thru 12-31-20  Date of Return to MD: see epic    Visit # / Visits authorized: 2 / 8 referral # [id number] 12-14-20 thru 2-12-21  Time In:12  Time Out: 1245  Total Billable Time: 45 minutes    Precautions:  universal    Subjective     Pt reports: no new issues  he is compliant with home exercise program.  Response to previous treatment:good  Functional change: grasp with most light tasks continues to improve    Pain: 0/10 rest; up to 5/10 light use  Location: diffuse      volar ache at wrist  Objective       Timed units:  2 therapeutic exercise                            Time:5768-3733    Untimed units:  fluidotherapy                           Time:      Measurements:                             II                III          key            3jaw                   tip  right              80#            75       18                12                     9  left                55              55       14                 9                      9        Fluido: 15'  U/s:n/a      UBE: n/a    Scar massage: reviewed    Stretches as tolerated-pain free: n/a      Manual: n/a    ROM: n/a    PRE: light grippers 2 x 10, red flex bar smiles/frowns 30 each; yellow putty key, 3 jaw, tip, cone, small splint stick pushdowns 30 each      HEP: see below      Home  Exercises and Education Provided     Education provided:     progress towards goals   likely tx progression  rationale of rehab interventions    Written Home Exercises/Program Provided: no          Previously issued exercises were reviewed if still part of current tx plan as well as any issued today and Dennis was able to demonstrate them prior to the end of the session.  Dennis demonstrated good understanding of the HEP provided.     See EMR under patient instructions and/or media for HEP issued today or in past    Assessment     Decreased  and pinch force noted, scar with good pliability, good effort in clinic          Pt would continue to benefit from skilled OT in order to facilitate full functional use long term.    Dennis is progressing well towards his goals and there are no updates to goals at this time. Pt prognosis is good  Pt will continue to benefit from skilled outpatient occupational therapy to address the deficits listed in the problem list on initial evaluation to provide pt/family education and to maximize pt's level of independence in the home and community environment.     Anticipated barriers to occupational therapy: edema, pain, stiffness, scar, weakness    Pt's spiritual, cultural and educational needs considered and pt agreeable to plan of care and goals.    Goals:  stg to be met in 2 weeks 1. Patient will be I with HEP 2. Patient will have 2/10 pain with light use 3. Patient will have = prom of bilateral wrist and digits to enhance affected arm use with ADL        ltg to be met by d/c 1. Patient will be I with d/c HEP 2. Patient will have 1/10 pain with all use 3. Patient will have about 75% /pinch  on affected side vs unaffected side to promote full functional use                                                                   4. Patient will be I with all ADL       all goals ongoing unless otherwise noted              Any goals met today ?  (if any met see  above)    Updates/Grading for next session: prn    Plan: edema control, scar management, ROM, PRE, desensitization, patient education, HEP      Chaitanya Voorhies, OT/CHT

## 2020-12-22 ENCOUNTER — CLINICAL SUPPORT (OUTPATIENT)
Dept: REHABILITATION | Facility: HOSPITAL | Age: 45
End: 2020-12-22
Payer: MEDICAID

## 2020-12-22 DIAGNOSIS — M25.442 EFFUSION OF JOINT OF LEFT HAND: ICD-10-CM

## 2020-12-22 DIAGNOSIS — R29.898 LEFT HAND WEAKNESS: ICD-10-CM

## 2020-12-22 DIAGNOSIS — M25.642 STIFFNESS OF LEFT HAND JOINT: ICD-10-CM

## 2020-12-22 DIAGNOSIS — M25.542 PAIN IN JOINT OF LEFT HAND: Primary | ICD-10-CM

## 2020-12-22 PROCEDURE — 97530 THERAPEUTIC ACTIVITIES: CPT | Mod: PN

## 2020-12-22 NOTE — PROGRESS NOTES
Occupational Therapy Daily Treatment Note     Date: 12/22/2020  Name: eDnnis Oden  Clinic Number: [id number]    Therapy Diagnosis:   Encounter Diagnoses   Name Primary?    Pain in joint of left hand Yes    Effusion of joint of left hand     Stiffness of left hand joint     Left hand weakness      Physician: Dami Campoverde MD    Physician Orders: evaluate and tx, see epic  Medical Diagnosis: left carpal tunnel release  Surgical Procedure and Date: left carpal tunnel release, roughly 2 months ago per patient  Evaluation Date: 12/3/2020  Insurance Authorization Period Expiration: referral # [id number] 12-14-20 thru 2-12-21  Plan of Care Certification Period: 12-3-20 thru 12-31-20  Date of Return to MD: see epic    Visit # / Visits authorized: 3 / 8 referral # [id number] 12-14-20 thru 2-12-21  Time In:1145  Time Out: 1230  Total Billable Time: 45 minutes    Precautions:  universal    Subjective     Pt reports: no new issues  he is compliant with home exercise program.  Response to previous treatment:good  Functional change: feels like  with required tasks getting easier    Pain: 0/10 rest; up to 4/10 light use  Location: diffuse      volar ache at wrist  Objective       Timed units:  2 therapeutic exercise                            Time:    Untimed units:  fluidotherapy                           Time:1145-12      Measurements:                                Fluido: 15'  U/s:n/a      UBE: n/a    Scar massage: reviewed    Stretches as tolerated-pain free: n/a      Manual: n/a    ROM: n/a    PRE:   blue, black, green digiflex 2 x 10  green flex bar smiles/frowns 30 each  yellow putty key, 3 jaw, tip, cone, small splint stick pull/push 30 each  Green putty small splint stick pushdowns x 30      HEP: see below        Home Exercises and Education Provided     Education provided:     probable d/c timeline        progress towards goals   likely tx progression  rationale of rehab interventions    Written Home  Exercises/Program Provided: no          Previously issued exercises were reviewed if still part of current tx plan as well as any issued today and Dennis was able to demonstrate them prior to the end of the session.  Dennis demonstrated good understanding of the HEP provided.     See EMR under patient instructions and/or media for HEP issued today or in past    Assessment     Functional use with good increase since day 1 of OT, scar with no tenderness, good effort in clinic          Pt would continue to benefit from skilled OT in order to facilitate full functional use long term.    Dennis is progressing well towards his goals and there are no updates to goals at this time. Pt prognosis is good  Pt will continue to benefit from skilled outpatient occupational therapy to address the deficits listed in the problem list on initial evaluation to provide pt/family education and to maximize pt's level of independence in the home and community environment.     Anticipated barriers to occupational therapy: edema, pain, stiffness, scar, weakness    Pt's spiritual, cultural and educational needs considered and pt agreeable to plan of care and goals.    Goals:  stg to be met in 2 weeks 1. Patient will be I with HEP- met 12-22-20 2. Patient will have 2/10 pain with light use 3. Patient will have = prom of bilateral wrist and digits to enhance affected arm use with ADL- met see previous notes        ltg to be met by d/c 1. Patient will be I with d/c HEP 2. Patient will have 1/10 pain with all use 3. Patient will have about 75% /pinch  on affected side vs unaffected side to promote full functional use                                                                   4. Patient will be I with all ADL       all goals ongoing unless otherwise noted              Any goals met today ?  (if any met see above)    Updates/Grading for next session: prn, consider d/c     Plan: edema control, scar management, ROM, PRE,  desensitization, patient education, HEP      Chaitanya Voorhies, OT/CHT

## 2020-12-28 ENCOUNTER — CLINICAL SUPPORT (OUTPATIENT)
Dept: REHABILITATION | Facility: HOSPITAL | Age: 45
End: 2020-12-28
Payer: MEDICAID

## 2020-12-28 DIAGNOSIS — M25.542 PAIN IN JOINT OF LEFT HAND: Primary | ICD-10-CM

## 2020-12-28 DIAGNOSIS — R29.898 LEFT HAND WEAKNESS: ICD-10-CM

## 2020-12-28 DIAGNOSIS — M25.442 EFFUSION OF JOINT OF LEFT HAND: ICD-10-CM

## 2020-12-28 DIAGNOSIS — M25.642 STIFFNESS OF LEFT HAND JOINT: ICD-10-CM

## 2020-12-28 PROCEDURE — 97530 THERAPEUTIC ACTIVITIES: CPT | Mod: PN

## 2020-12-28 NOTE — PROGRESS NOTES
Occupational Therapy Daily Treatment Note     Date: 12/28/2020  Name: Dennis Oden  Clinic Number: [id number]    Therapy Diagnosis:   Encounter Diagnoses   Name Primary?    Pain in joint of left hand Yes    Effusion of joint of left hand     Stiffness of left hand joint     Left hand weakness      Physician: Dami Campoverde MD    Physician Orders: evaluate and tx, see epic  Medical Diagnosis: left carpal tunnel release  Surgical Procedure and Date: left carpal tunnel release, roughly 2 months ago per patient  Evaluation Date: 12/3/2020  Insurance Authorization Period Expiration: referral # [id number] 12-14-20 thru 2-12-21  Plan of Care Certification Period: 12-3-20 thru 12-31-20  Date of Return to MD: see epic    Visit # / Visits authorized: 4 / 8 referral # [id number] 12-14-20 thru 2-12-21  Time In:1045  Time Out: 1130  Total Billable Time: 45 minutes    Precautions:  universal    Subjective     Pt reports: no new issues  he is compliant with home exercise program.  Response to previous treatment:good  Functional change: overall use steadily increasing with ADL    Pain: 0/10 rest; up to 4/10 light use  Location: diffuse      volar ache at wrist  Objective       Timed units:  2 therapeutic exercise                            Time:    Untimed units:  fluidotherapy                                       Time:1045-11      Measurements:                             II                III          key            3jaw                  tip  right             75#              80       20                11                   10  left                60              60       14                10                     8                           Fluido: 15'  U/s:n/a      UBE: n/a    Scar massage: reviewed    Stretches as tolerated-pain free: n/a      Manual: n/a    ROM: n/a    PRE:   blue, black, green digiflex 2 x 10  green flex bar smiles/frowns 30 each  yellow putty key, 3 jaw, tip, cone, small splint stick pull/push  30 each  Green putty small splint stick pushdowns x 30      HEP: see below        Home Exercises and Education Provided     Education provided:           progress towards goals   likely tx progression  rationale of rehab interventions    Written Home Exercises/Program Provided: no          Previously issued exercises were reviewed if still part of current tx plan as well as any issued today and Dennis was able to demonstrate them prior to the end of the session.  Dennis demonstrated good understanding of the HEP provided.     See EMR under patient instructions and/or media for HEP issued today or in past    Assessment     Good reduction in pain, stiffness, and functional use since day 1 of OT          Pt would continue to benefit from skilled OT in order to facilitate full functional use long term.    Dennis is progressing well towards his goals and there are no updates to goals at this time. Pt prognosis is good  Pt will continue to benefit from skilled outpatient occupational therapy to address the deficits listed in the problem list on initial evaluation to provide pt/family education and to maximize pt's level of independence in the home and community environment.     Anticipated barriers to occupational therapy: edema, pain, stiffness, scar, weakness    Pt's spiritual, cultural and educational needs considered and pt agreeable to plan of care and goals.    Goals:  stg to be met in 2 weeks 1. Patient will be I with HEP- met 12-22-20 2. Patient will have 2/10 pain with light use 3. Patient will have = prom of bilateral wrist and digits to enhance affected arm use with ADL- met see previous notes        ltg to be met by d/c 1. Patient will be I with d/c HEP 2. Patient will have 1/10 pain with all use 3. Patient will have about 75% /pinch  on affected side vs unaffected side to promote full functional use                                                                   4. Patient will be I with all ADL        all goals ongoing unless otherwise noted              Any goals met today ?  (if any met see above)    Updates/Grading for next session: prn    Plan: edema control, scar management, ROM, PRE, desensitization, patient education, HEP      Chaitanya Voorhies, OT/CHT

## 2020-12-31 ENCOUNTER — CLINICAL SUPPORT (OUTPATIENT)
Dept: REHABILITATION | Facility: HOSPITAL | Age: 45
End: 2020-12-31
Payer: MEDICAID

## 2020-12-31 DIAGNOSIS — R29.898 LEFT HAND WEAKNESS: ICD-10-CM

## 2020-12-31 DIAGNOSIS — M25.642 STIFFNESS OF LEFT HAND JOINT: ICD-10-CM

## 2020-12-31 DIAGNOSIS — M25.542 PAIN IN JOINT OF LEFT HAND: Primary | ICD-10-CM

## 2020-12-31 DIAGNOSIS — M25.442 EFFUSION OF JOINT OF LEFT HAND: ICD-10-CM

## 2020-12-31 PROCEDURE — 97530 THERAPEUTIC ACTIVITIES: CPT | Mod: PN

## 2020-12-31 NOTE — PATIENT INSTRUCTIONS
home program 12-31-20  -slowly progress activity as tolerated-pain free  -continue scar massage for at least one month  -do below yellow putty exercises 2 sets of 20, 3 x week (have a day of rest in between sessions) for at least a month or so  *squeeze putty  *pinch between side of index and tip of thumb (like a key)  *pinch between tip of index, long, and thumb  *pinch between tip of index and thumb

## 2020-12-31 NOTE — PROGRESS NOTES
Occupational Therapy Daily Treatment Note     Date: 12/31/2020  Name: Dennis Oden  Clinic Number: [id number]    Therapy Diagnosis:   Encounter Diagnoses   Name Primary?    Pain in joint of left hand Yes    Effusion of joint of left hand     Stiffness of left hand joint     Left hand weakness      Physician: Dami Campoverde MD    Physician Orders: evaluate and tx, see epic  Medical Diagnosis: left carpal tunnel release  Surgical Procedure and Date: left carpal tunnel release, roughly 2 months ago per patient  Evaluation Date: 12/3/2020  Insurance Authorization Period Expiration: referral # [id number] 12-14-20 thru 2-12-21  Plan of Care Certification Period: 12-3-20 thru 12-31-20  Date of Return to MD: see epic    Visit # / Visits authorized: 5 / 8 referral # [id number] 12-14-20 thru 2-12-21  Time In:1045  Time Out: 1130  Total Billable Time: 45 minutes    Precautions:  universal    Subjective     Pt reports: no new issues  he is compliant with home exercise program.  Response to previous treatment:good  Functional change: doing all required tasks    Pain: 0/10 rest; up to 1/10 all use  Location: diffuse      volar ache at wrist  Objective       Timed units:  2 therapeutic exercise                            Time:    Untimed units:  fluidotherapy                                       Time:1045-11      Measurements:                             Fluido: 15'  U/s:n/a      UBE: n/a    Scar massage: reviewed    Stretches as tolerated-pain free: n/a      Manual: n/a    ROM: n/a    PRE:   blue, black, green digiflex 2 x 10  green flex bar smiles/frowns 30 each  yellow putty key, 3 jaw, tip, cone, small splint stick pull/push 30 each  Green putty small splint stick pushdowns x 30      HEP: see below        Home Exercises and Education Provided     Education provided:       Explained continued clinic OT not indicated      Written Home Exercises/Program Provided: yes        Previously issued exercises were reviewed  if still part of current tx plan as well as any issued today and Dennis was able to demonstrate them prior to the end of the session.  Dennis demonstrated good understanding of the HEP provided.     See EMR under patient instructions and/or media for HEP issued today or in past    Assessment       HEP issued today should improve strength and use long term        Pt's spiritual, cultural and educational needs considered and pt agreeable to plan of care and goals.    Goals:  stg to be met in 2 weeks 1. Patient will be I with HEP- met 12-22-20 2. Patient will have 2/10 pain with light use 3. Patient will have = prom of bilateral wrist and digits to enhance affected arm use with ADL- met see previous notes        ltg to be met by d/c 1. Patient will be I with d/c HEP 2. Patient will have 1/10 pain with all use 3. Patient will have about 75% /pinch  on affected side vs unaffected side to promote full functional use                                                                   4. Patient will be I with all ADL       all goals met              Any goals met today ?  (if any met see above)        Plan: d/c      Chaitanya Everett OT/CHT

## 2021-01-04 ENCOUNTER — DOCUMENTATION ONLY (OUTPATIENT)
Dept: REHABILITATION | Facility: HOSPITAL | Age: 46
End: 2021-01-04

## 2021-02-04 ENCOUNTER — HOSPITAL ENCOUNTER (OUTPATIENT)
Dept: RADIOLOGY | Facility: HOSPITAL | Age: 46
Discharge: HOME | End: 2021-02-04
Attending: NURSE PRACTITIONER
Payer: MEDICAID

## 2021-02-04 DIAGNOSIS — M54.50 LOW BACK PAIN: ICD-10-CM

## 2021-02-04 DIAGNOSIS — M25.561 RIGHT KNEE PAIN: Primary | ICD-10-CM

## 2021-02-04 DIAGNOSIS — M25.561 RIGHT KNEE PAIN: ICD-10-CM

## 2021-02-04 PROCEDURE — 73560 X-RAY EXAM OF KNEE 1 OR 2: CPT | Mod: TC,PO,RT

## 2021-02-04 PROCEDURE — 72110 X-RAY EXAM L-2 SPINE 4/>VWS: CPT | Mod: TC,PO

## 2021-02-05 DIAGNOSIS — M54.50 LOW BACK PAIN: Primary | ICD-10-CM

## 2021-03-19 DIAGNOSIS — S83.241S TEAR OF MEDIAL MENISCUS OF RIGHT KNEE, CURRENT, UNSPECIFIED TEAR TYPE, SEQUELA: Primary | ICD-10-CM

## 2022-01-03 DIAGNOSIS — R00.2 PALPITATIONS: Primary | ICD-10-CM

## 2022-01-05 ENCOUNTER — HOSPITAL ENCOUNTER (OUTPATIENT)
Dept: PREADMISSION TESTING | Facility: HOSPITAL | Age: 47
Discharge: HOME | End: 2022-01-05
Attending: NURSE PRACTITIONER
Payer: MEDICAID

## 2022-01-05 DIAGNOSIS — R00.2 PALPITATIONS: ICD-10-CM

## 2022-01-05 PROCEDURE — 93005 ELECTROCARDIOGRAM TRACING: CPT | Performed by: INTERNAL MEDICINE

## 2022-01-05 PROCEDURE — 93010 ELECTROCARDIOGRAM REPORT: CPT | Mod: ,,, | Performed by: INTERNAL MEDICINE

## 2022-01-05 PROCEDURE — 93010 EKG 12-LEAD: ICD-10-PCS | Mod: ,,, | Performed by: INTERNAL MEDICINE

## 2022-04-05 ENCOUNTER — HOSPITAL ENCOUNTER (EMERGENCY)
Facility: HOSPITAL | Age: 47
Discharge: HOME | End: 2022-04-06
Attending: EMERGENCY MEDICINE
Payer: MEDICAID

## 2022-04-05 DIAGNOSIS — M54.50 ACUTE BILATERAL LOW BACK PAIN WITHOUT SCIATICA: Primary | ICD-10-CM

## 2022-04-05 PROCEDURE — 99284 EMERGENCY DEPT VISIT MOD MDM: CPT

## 2022-04-05 RX ORDER — KETOROLAC TROMETHAMINE 10 MG/1
10 TABLET, FILM COATED ORAL EVERY 6 HOURS
Qty: 20 TABLET | Refills: 0 | Status: SHIPPED | OUTPATIENT
Start: 2022-04-05 | End: 2022-04-10

## 2022-04-05 RX ORDER — METHOCARBAMOL 500 MG/1
1000 TABLET, FILM COATED ORAL 4 TIMES DAILY
Qty: 20 TABLET | Refills: 0 | Status: SHIPPED | OUTPATIENT
Start: 2022-04-05 | End: 2022-04-10

## 2022-04-05 RX ORDER — KETOROLAC TROMETHAMINE 10 MG/1
10 TABLET, FILM COATED ORAL
Status: DISCONTINUED | OUTPATIENT
Start: 2022-04-06 | End: 2022-04-05

## 2022-04-05 RX ORDER — KETOROLAC TROMETHAMINE 30 MG/ML
30 INJECTION, SOLUTION INTRAMUSCULAR; INTRAVENOUS
Status: COMPLETED | OUTPATIENT
Start: 2022-04-06 | End: 2022-04-06

## 2022-04-05 RX ORDER — METHOCARBAMOL 500 MG/1
1000 TABLET, FILM COATED ORAL
Status: COMPLETED | OUTPATIENT
Start: 2022-04-06 | End: 2022-04-06

## 2022-04-06 VITALS
OXYGEN SATURATION: 99 % | TEMPERATURE: 98 F | RESPIRATION RATE: 16 BRPM | SYSTOLIC BLOOD PRESSURE: 136 MMHG | DIASTOLIC BLOOD PRESSURE: 82 MMHG | HEART RATE: 75 BPM

## 2022-04-06 PROCEDURE — 63600175 PHARM REV CODE 636 W HCPCS: Performed by: EMERGENCY MEDICINE

## 2022-04-06 PROCEDURE — 96372 THER/PROPH/DIAG INJ SC/IM: CPT | Performed by: EMERGENCY MEDICINE

## 2022-04-06 PROCEDURE — 25000003 PHARM REV CODE 250: Performed by: EMERGENCY MEDICINE

## 2022-04-06 PROCEDURE — 96372 THER/PROPH/DIAG INJ SC/IM: CPT

## 2022-04-06 RX ADMIN — METHOCARBAMOL TABLETS 1000 MG: 500 TABLET, COATED ORAL at 12:04

## 2022-04-06 RX ADMIN — KETOROLAC TROMETHAMINE 30 MG: 30 INJECTION, SOLUTION INTRAMUSCULAR; INTRAVENOUS at 12:04

## 2022-04-06 NOTE — ED PROVIDER NOTES
Encounter Date: 4/5/2022       History     Chief Complaint   Patient presents with    Back Pain     Low back pain since this AM     Chief complaint is back pain that started this morning.  It is in his lower to mid back.  Worse on movement.  History of similar problems in the past.  He states he got shot and did well with that here when he was treated in the past.  He also has a history of carpal tunnel surgery on the left and knee pain as well.  No history of cancer.  No history of recent trauma.  No history of IV drug use.  No significant weight loss.  No neurological deficits.        Review of patient's allergies indicates:  No Known Allergies  No past medical history on file.  No past surgical history on file.  No family history on file.  Social History     Tobacco Use    Smoking status: Never Smoker    Smokeless tobacco: Never Used     Review of Systems   Constitutional: Negative for chills and fever.   HENT: Negative for ear pain, rhinorrhea and sore throat.    Eyes: Negative for pain and visual disturbance.   Respiratory: Negative for cough and shortness of breath.    Cardiovascular: Negative for chest pain and palpitations.   Gastrointestinal: Negative for abdominal pain, constipation, diarrhea, nausea and vomiting.   Genitourinary: Negative for dysuria, frequency, hematuria and urgency.   Musculoskeletal: Positive for back pain. Negative for joint swelling and myalgias.   Skin: Negative for rash.   Neurological: Negative for dizziness, seizures, weakness and headaches.   Psychiatric/Behavioral: Negative for dysphoric mood. The patient is not nervous/anxious.        Physical Exam     Initial Vitals [04/05/22 2337]   BP Pulse Resp Temp SpO2   131/85 70 18 98.6 °F (37 °C) 98 %      MAP       --         Physical Exam    Nursing note and vitals reviewed.  Constitutional: He appears well-developed and well-nourished.   HENT:   Head: Normocephalic and atraumatic.   Nose: Nose normal.   Eyes: Conjunctivae, EOM  and lids are normal. Pupils are equal, round, and reactive to light.   Neck: Trachea normal. Neck supple. No thyroid mass present.   Cardiovascular: Normal rate, regular rhythm and normal heart sounds.   Pulmonary/Chest: Breath sounds normal. No respiratory distress.   Abdominal: Abdomen is soft. There is no abdominal tenderness.   Musculoskeletal:         General: Normal range of motion.      Cervical back: Neck supple.      Comments: Patient with pain on turning of his upper torso and bending to the paravertebral area mid and lower back.     Neurological: He is alert and oriented to person, place, and time. He has normal strength and normal reflexes. No cranial nerve deficit or sensory deficit.   Skin: Skin is warm and dry.   Psychiatric: He has a normal mood and affect. His speech is normal and behavior is normal. Judgment and thought content normal.         ED Course   Procedures  Labs Reviewed - No data to display       Imaging Results    None          Medications   ketorolac injection 30 mg (has no administration in time range)   methocarbamoL tablet 1,000 mg (has no administration in time range)                          Clinical Impression:   Final diagnoses:  [M54.50] Acute bilateral low back pain without sciatica (Primary)          ED Disposition Condition    Discharge Stable        ED Prescriptions     Medication Sig Dispense Start Date End Date Auth. Provider    methocarbamoL (ROBAXIN) 500 MG Tab Take 2 tablets (1,000 mg total) by mouth 4 (four) times daily. for 5 days 20 tablet 4/5/2022 4/10/2022 Breanne Rascon MD    ketorolac (TORADOL) 10 mg tablet Take 1 tablet (10 mg total) by mouth every 6 (six) hours. for 5 days 20 tablet 4/5/2022 4/10/2022 Breanne Rascon MD        Follow-up Information     Follow up With Specialties Details Why Contact Info    Franchesca Richard NP Internal Medicine Schedule an appointment as soon as possible for a visit in 3 days  [address] [place] [address]  [address]  [place] [address]  [phone]             Breanne Rascon MD  04/05/22 [id number]

## 2022-05-06 DIAGNOSIS — Z98.890 STATUS POST ARTHROSCOPY OF RIGHT KNEE: Primary | ICD-10-CM

## 2022-05-18 ENCOUNTER — CLINICAL SUPPORT (OUTPATIENT)
Dept: REHABILITATION | Facility: HOSPITAL | Age: 47
End: 2022-05-18
Payer: MEDICAID

## 2022-05-18 DIAGNOSIS — M25.561 CHRONIC PAIN OF RIGHT KNEE: ICD-10-CM

## 2022-05-18 DIAGNOSIS — M25.661 DECREASED RANGE OF MOTION (ROM) OF RIGHT KNEE: ICD-10-CM

## 2022-05-18 DIAGNOSIS — G89.29 CHRONIC PAIN OF RIGHT KNEE: ICD-10-CM

## 2022-05-18 PROCEDURE — 97161 PT EVAL LOW COMPLEX 20 MIN: CPT | Mod: PN

## 2022-05-18 NOTE — PLAN OF CARE
[facility]   Physical Therapy Initial Evaluation     Date: 5/18/2022   Name: Dennis Oden  Clinic Number: [id number]    Therapy Diagnosis:   Encounter Diagnoses   Name Primary?    Decreased range of motion (ROM) of right knee     Chronic pain of right knee      Physician: Janelle Taveras PA    Physician Orders: PT Eval and Treat   Medical Diagnosis from Referral: Z98.890 (ICD-10-CM) - Status post arthroscopy of right knee  Evaluation Date: 5/18/2022  Authorization Period Expiration: 6/5/22  Plan of Care Expiration: 8/18/22  Progress Note Due: 6/18/22  Visit # / Visits authorized: 1/ 1   FOTO: 65% limitation  FOTO 1st follow up:  FOTO 2nd follow up:    Precautions: Standard, meniscectomy 4/22/22 5/6/22    Time In: 8:44  Time Out: 9:32  Total Appointment Time (timed & untimed codes): 42 minutes      SUBJECTIVE     Date of onset: 4/22/22    History of current condition - Dennis reports: having right knee meniscectomy about 1 month ago. Prior to the surgery he only had a little pain/catching. Since the surgery he has been in significant pain for 1 week and now pain is still there. He is having difficulty walking, feels disabled, and difficulty with work. He feels like he is walking different and now he is getting some pain on his left knee.     Prior Therapy: none  Social History: lives with 7 kids  Occupation: works as a  which requires a lot of standing, squatting  Prior Level of Function: independent  Current Level of Function: difficulty with ambulation, squatting, and work activities    Pain:  Current 7/10, worst 5/10, best 9/10   Location: right knee  .   Description: Aching, Dull and Sharp  Aggravating Factors: Walking, Extension, Flexing and squatting  Easing Factors: rest    Patients goals: to be independent and pain free with ambulation, running, and work activities.      Medical History:   No past medical history on file.    Surgical History:   Dennis Oden  has  "no past surgical history on file.    Medications:   Dennis currently has no medications in their medication list.    Allergies:   Review of patient's allergies indicates:  No Known Allergies       OBJECTIVE     Observation: decreased WB on right LE, knee flexed during stance, [place] on right.     Functional tests:   DL Squat: decreased motion, weight shift to left     Range of Motion (Passive):   Knee Right  Left    Flexion 100* 130   Extension -3 (lacking) 0       Lower Extremity Strength  Right LE  Left LE    Quadriceps: 3+/5 Quadriceps: 5/5   Hamstrings: NT Hamstrings: NT   Hip flexion (seated): 4-/5 Hip flexion (seated): 4/5   Hip extension:  NT Hip extension: NT   Hip abd: 3-/5 Hip Abd:  4/5   Hip ER:  NT Hip ER:  NT   Hip IR: NT Hip IR: NT     Joint Mobility: Hypomobile patella inferior and superior    Palpation: medial/lateral patella    Sensation: intact emily LE to light touch    Edema: moderate edema on right knee along PF joint    Limitation/Restriction for FOTO knee Survey    Therapist reviewed FOTO scores for Dennis Oden on 5/18/2022.   FOTO documents entered into [employer] - see Media section.    Limitation Score: 65%         TREATMENT     Total Treatment time (time-based codes) separate from Evaluation: 15 minutes      Dennis received the treatments listed below:      therapeutic exercises to develop strength and ROM for 10 minutes including:  Quad sets 10x5"  Heel slides 15x  Ice with heel prop 5'    manual therapy techniques: Joint mobilizations were applied to the: knee for 5 minutes, including:  PF joint mobilizations  Gentle lateral tibia glides      PATIENT EDUCATION AND HOME EXERCISES     Education provided:   - HEP  - importance of reducing edema  - importance of quad control    Written Home Exercises Provided: yes. Exercises were reviewed and Dennis was able to demonstrate them prior to the end of the session.  Dennis demonstrated fair  understanding of the education provided. See " EMR under Patient Instructions for exercises provided during therapy sessions.    ASSESSMENT     Dennis is a 46 y.o. male referred to outpatient Physical Therapy with a medical diagnosis of Status post arthroscopy of right knee. Patient presents with decreased knee range of motion, decreased quad control, abnormal gait, and pain with functional activities affecting his daily activities. Pt was 15 minutes late today and was limited with assessment. He was disappointed with his progress and pain. Educated significantly on the importance of reducing activity, joint mechanics, and quad activation. He is appropriate for skilled PT interventions to address deficits listed and return to PLOF.     Patient prognosis is Good.   Patient will benefit from skilled outpatient Physical Therapy to address the deficits stated above and in the chart below, provide patient /family education, and to maximize patientt's level of independence.     Plan of care discussed with patient: Yes  Patient's spiritual, cultural and educational needs considered and patient is agreeable to the plan of care and goals as stated below:     Anticipated Barriers for therapy: compliance, language    Medical Necessity is demonstrated by the following  History  Co-morbidities and personal factors that may impact the plan of care Co-morbidities:   previous surgery    Personal Factors:   age  lifestyle     moderate   Examination  Body Structures and Functions, activity limitations and participation restrictions that may impact the plan of care Body Regions:   back  lower extremities    Body Systems:    gross symmetry  ROM  strength  gross coordinated movement  balance  gait  motor control  motor learning    Participation Restrictions:   Compliance, language    Activity limitations:   Learning and applying knowledge  no deficits    General Tasks and Commands  no deficits    Communication  no deficits    Mobility  walking    Self care  no deficits    Domestic  Life  no deficits    Interactions/Relationships  no deficits    Life Areas  no deficits    Community and Social Life  no deficits         high   Clinical Presentation stable and uncomplicated low   Decision Making/ Complexity Score: low     GOALS: Short Term Goals:  4-6 weeks  1.Report decreased knee pain  < / =  3/10  to increase tolerance for ambulation and work acitivities  2. Increase knee ROM to full passive in order to be able to perform ADLs without difficulty.  3. Increase strength by 1/3 MMT grade in quad  to increase tolerance for ADL and work activities.  4. Pt to tolerate HEP to improve ROM and independence with ADL's    Long Term Goals: 8-12 weeks  1.Report decreased knee pain < / = 2/10  to increase tolerance for ambulation and work  2.Patient goal: return to pain free ambulation and work activities to take care of kids  3.Increase strength to >/= 4+/5 in quad  to increase tolerance for ADL and work activities.  4. Pt will report at CJ level (20-40% impaired) on FOTO knee to demonstrate increase in LE function with every day tasks.       PLAN   Plan of care Certification: 5/18/2022 to 8/18/22.    Outpatient Physical Therapy 2 times weekly for 12 weeks to include the following interventions: Electrical Stimulation NMES, Gait Training, Manual Therapy, Moist Heat/ Ice, Neuromuscular Re-ed, Patient Education, Self Care, Therapeutic Activities and Therapeutic Exercise.     Nabil García, PT      I CERTIFY THE NEED FOR THESE SERVICES FURNISHED UNDER THIS PLAN OF TREATMENT AND WHILE UNDER MY CARE   Physician's comments:     Physician's Signature: ___________________________________________________

## 2022-05-25 ENCOUNTER — CLINICAL SUPPORT (OUTPATIENT)
Dept: REHABILITATION | Facility: HOSPITAL | Age: 47
End: 2022-05-25
Payer: MEDICAID

## 2022-05-25 DIAGNOSIS — M25.561 CHRONIC PAIN OF RIGHT KNEE: ICD-10-CM

## 2022-05-25 DIAGNOSIS — G89.29 CHRONIC PAIN OF RIGHT KNEE: ICD-10-CM

## 2022-05-25 DIAGNOSIS — M25.661 DECREASED RANGE OF MOTION (ROM) OF RIGHT KNEE: Primary | ICD-10-CM

## 2022-05-25 PROCEDURE — 97110 THERAPEUTIC EXERCISES: CPT | Mod: PN

## 2022-05-25 NOTE — PROGRESS NOTES
"    Physical Therapy Daily Treatment Note     Name: Dennis Oden  Clinic Number: [id number]    Therapy Diagnosis:   Encounter Diagnoses   Name Primary?    Decreased range of motion (ROM) of right knee Yes    Chronic pain of right knee      Physician: Janelle Taveras PA    Visit Date: 5/25/2022  Physician Orders: PT Eval and Treat   Medical Diagnosis from Referral: Z98.890 (ICD-10-CM) - Status post arthroscopy of right knee  Evaluation Date: 5/18/2022  Authorization Period Expiration: 6/5/22  Plan of Care Expiration: 8/18/22  Progress Note Due: 6/18/22  Visit # / Visits authorized: 1/20  FOTO: 65% limitation  FOTO 1st follow up:  FOTO 2nd follow up:     Precautions: Standard, meniscectomy 4/22/22 5/6/22     Time In: 14:28  Time Out: 15:16  Total Appointment Time (timed & untimed codes): 46 minutes    Subjective     Pt reports: still having pain when bending and with trying to pray. He has to go into deep knee bend and squatting. Modifies his praying with knee bent into valgus position.     He was compliant with home exercise program.  Response to previous treatment: improved range of motion  Functional change: improved gait     Pain: 5/10  Location: right knee      Objective     **All exercises billed as therapeutic exercises per medicaid guidelines**    PT technician assisted with treatment under direct supervision of PT       Dennis received therapeutic exercises to develop strength, endurance and ROM for 35 minutes including:  Quad sets 30x5"  Heel slides 30x  Heel prop 5'  Hip abd 2x10 each  Clams with red TB 2x15 each  Double leg press 50# 3x10  Single leg press 25# 2x10 each    Dennis received the following manual therapy techniques: Joint mobilizations were applied to the: knee for 10 minutes, including:  Knee assessments  Patella mobilizations   Fat pad mobilizations    Home Exercises Provided and Patient Education Provided     Education provided:   - HEP  - importance of quad control  - modifying " position for praying    Written Home Exercises Provided: yes.  Exercises were reviewed and Dennis was able to demonstrate them prior to the end of the session.  Dennis demonstrated good  understanding of the education provided.     See EMR under Patient Instructions for exercises provided prior visit.    Assessment     Dennis presented with same pain but had improved knee range of motion. Still having difficulty with quad control and unable to perform SAQ due to pain. Discussed praying position and how to modify position and to use softer mat. Added hip and LE strengthening exercises today. Will continue to progress as tolerated.     Dennis Is progressing well towards his goals.   Pt prognosis is Good.     Pt will continue to benefit from skilled outpatient physical therapy to address the deficits listed in the problem list box on initial evaluation, provide pt/family education and to maximize pt's level of independence in the home and community environment.     Pt's spiritual, cultural and educational needs considered and pt agreeable to plan of care and goals.    Anticipated barriers to physical therapy: praying, compliance    GOALS: Short Term Goals:  4-6 weeks - progressing not met  1.Report decreased knee pain  < / =  3/10  to increase tolerance for ambulation and work acitivities  2. Increase knee ROM to full passive in order to be able to perform ADLs without difficulty.  3. Increase strength by 1/3 MMT grade in quad  to increase tolerance for ADL and work activities.  4. Pt to tolerate HEP to improve ROM and independence with ADL's     Long Term Goals: 8-12 weeks - progressing not met  1.Report decreased knee pain < / = 2/10  to increase tolerance for ambulation and work  2.Patient goal: return to pain free ambulation and work activities to take care of kids  3.Increase strength to >/= 4+/5 in quad  to increase tolerance for ADL and work activities.  4. Pt will report at CJ level (20-40% impaired) on  FOTO knee to demonstrate increase in LE function with every day tasks.    Plan   Plan of care Certification: 5/18/2022 to 8/18/22.    Progress with POC with quad control, knee range of motion, functional activities, and pain reduction activities.     Nabil García, PT

## 2022-05-30 ENCOUNTER — CLINICAL SUPPORT (OUTPATIENT)
Dept: REHABILITATION | Facility: HOSPITAL | Age: 47
End: 2022-05-30
Payer: MEDICAID

## 2022-05-30 DIAGNOSIS — M25.661 DECREASED RANGE OF MOTION (ROM) OF RIGHT KNEE: Primary | ICD-10-CM

## 2022-05-30 DIAGNOSIS — M25.561 CHRONIC PAIN OF RIGHT KNEE: ICD-10-CM

## 2022-05-30 DIAGNOSIS — G89.29 CHRONIC PAIN OF RIGHT KNEE: ICD-10-CM

## 2022-05-30 PROCEDURE — 97110 THERAPEUTIC EXERCISES: CPT | Mod: PN

## 2022-05-30 NOTE — PROGRESS NOTES
Physical Therapy Daily Treatment Note     Name: Dennis Oden  Clinic Number: [id number]    Therapy Diagnosis:   Encounter Diagnoses   Name Primary?    Decreased range of motion (ROM) of right knee Yes    Chronic pain of right knee      Physician: Janelle Taveras PA    Visit Date: 5/30/2022  Physician Orders: PT Eval and Treat   Medical Diagnosis from Referral: Z98.890 (ICD-10-CM) - Status post arthroscopy of right knee  Evaluation Date: 5/18/2022  Authorization Period Expiration: 6/5/22  Plan of Care Expiration: 8/18/22  Progress Note Due: 6/18/22  Visit # / Visits authorized: 2/20  FOTO: 65% limitation  FOTO 1st follow up:  FOTO 2nd follow up:     Precautions: Standard, meniscectomy 4/22/22 5/6/22     Time In: 0837  Time Out: 0917  Total Appointment Time (timed & untimed codes): 40 minutes    Subjective     Pt reports: still weak, still can't bend, still having trouble walking    He was compliant with home exercise program.  Response to previous treatment: improved range of motion  Functional change: improved gait     Pain: 5/10  Location: right knee      Objective     **All exercises billed as therapeutic exercises per medicaid guidelines**    PT technician assisted with treatment under direct supervision of PT       Dennis received therapeutic exercises to develop strength, endurance and ROM for 32 minutes including:  ]  Short arc quad on 1/2 foam roll 3x10   Short arc quad  On full [place] 3x10   Long arc quad  3x10   Terminal knee extension with Green [employer] 3x10   Supine heel slides x10   Double leg press 75# 3x10  Single leg press 50# 3x10 each  Clamshells with Green [employer] 3x12 each side       Heel prop 5'  Hip abd 2x10 each    Dennis received the following manual therapy techniques: Joint mobilizations were applied to the: knee for 8 minutes, including:      Patella mobilizations   Fat pad mobilizations  Posterior tibial glides with inferior patellar glide grade III    Home Exercises  Provided and Patient Education Provided     Education provided:   - HEP  - importance of quad control  - modifying position for praying    Written Home Exercises Provided: yes.  Exercises were reviewed and Dennis was able to demonstrate them prior to the end of the session.  Dennis demonstrated good  understanding of the education provided.     See EMR under Patient Instructions for exercises provided prior visit.    Assessment     Dennis continues with significant quad weakness, unable to achieve full knee extension with a short arc quad. This deficit is the most limiting factor at this time. Will progress as able.     Dennis Is progressing well towards his goals.   Pt prognosis is Good.     Pt will continue to benefit from skilled outpatient physical therapy to address the deficits listed in the problem list box on initial evaluation, provide pt/family education and to maximize pt's level of independence in the home and community environment.     Pt's spiritual, cultural and educational needs considered and pt agreeable to plan of care and goals.    Anticipated barriers to physical therapy: praying, compliance    GOALS: Short Term Goals:  4-6 weeks - progressing not met  1.Report decreased knee pain  < / =  3/10  to increase tolerance for ambulation and work acitivities  2. Increase knee ROM to full passive in order to be able to perform ADLs without difficulty.  3. Increase strength by 1/3 MMT grade in quad  to increase tolerance for ADL and work activities.  4. Pt to tolerate HEP to improve ROM and independence with ADL's     Long Term Goals: 8-12 weeks - progressing not met  1.Report decreased knee pain < / = 2/10  to increase tolerance for ambulation and work  2.Patient goal: return to pain free ambulation and work activities to take care of kids  3.Increase strength to >/= 4+/5 in quad  to increase tolerance for ADL and work activities.  4. Pt will report at CJ level (20-40% impaired) on FOTO knee to  demonstrate increase in LE function with every day tasks.    Plan   Plan of care Certification: 5/18/2022 to 8/18/22.    Progress with POC with quad control, knee range of motion, functional activities, and pain reduction activities.     Marco A Ruiz, PT

## 2022-06-01 ENCOUNTER — CLINICAL SUPPORT (OUTPATIENT)
Dept: REHABILITATION | Facility: HOSPITAL | Age: 47
End: 2022-06-01
Payer: MEDICAID

## 2022-06-01 DIAGNOSIS — M25.661 DECREASED RANGE OF MOTION (ROM) OF RIGHT KNEE: Primary | ICD-10-CM

## 2022-06-01 DIAGNOSIS — G89.29 CHRONIC PAIN OF RIGHT KNEE: ICD-10-CM

## 2022-06-01 DIAGNOSIS — M25.561 CHRONIC PAIN OF RIGHT KNEE: ICD-10-CM

## 2022-06-01 PROCEDURE — 97110 THERAPEUTIC EXERCISES: CPT | Mod: PN

## 2022-06-01 NOTE — PROGRESS NOTES
Physical Therapy Daily Treatment Note     Name: Dennis Oden  Clinic Number: [id number]    Therapy Diagnosis:   Encounter Diagnoses   Name Primary?    Decreased range of motion (ROM) of right knee Yes    Chronic pain of right knee      Physician: Janelle Taveras PA    Visit Date: 6/1/2022  Physician Orders: PT Eval and Treat   Medical Diagnosis from Referral: Z98.890 (ICD-10-CM) - Status post arthroscopy of right knee  Evaluation Date: 5/18/2022  Authorization Period Expiration: 6/5/22  Plan of Care Expiration: 8/18/22  Progress Note Due: 6/18/22  Visit # / Visits authorized: 3/20  FOTO: 65% limitation  FOTO 1st follow up:  FOTO 2nd follow up:     Precautions: Standard, meniscectomy 4/22/22 5/6/22     Time In: 10:01  Time Out: 10:58  Total Appointment Time (timed & untimed codes): 54 minutes    Subjective     Pt reports: feels a little better but still getting pain with walking/squatting. Still having difficulty with sharp turns.     He was compliant with home exercise program.  Response to previous treatment: improved range of motion  Functional change: improved gait     Pain: 5/10  Location: right knee      Objective     **All exercises billed as therapeutic exercises per medicaid guidelines**    PT technician assisted with treatment under direct supervision of PT       Dennis received therapeutic exercises to develop strength, endurance and ROM for 34 minutes including:    Short arc quad on 1/2 foam roll 3x10   Long arc quad  2x15   Terminal knee extension with Green Theraband 3x10   Supine heel slides x10   Double leg press 100# 3x15  Single leg press 50# 3x10 each  Clamshells with Green Theraband 3x12 each side   Lateral band walks green TB at window 3 rounds of 4 laps    Heel prop 5'  Hip abd 2x10 each    Not Performed  Short arc quad  On full boalster 3x10     Dennis received the following manual therapy techniques: Joint mobilizations were applied to the: knee for 10 minutes,  including:    Patella mobilizations   Fat pad mobilizations  Posterior tibial glides with inferior patellar glide grade III    Home Exercises Provided and Patient Education Provided     Education provided:   - HEP  - importance of quad control  - modifying position for praying    Written Home Exercises Provided: yes.  Exercises were reviewed and Dennis was able to demonstrate them prior to the end of the session.  Dennis demonstrated good  understanding of the education provided.     See EMR under Patient Instructions for exercises provided prior visit.    Assessment     Dennis still having difficulty with full knee range of motion and quad control. Improved from last session and increased resistance. Added lat band walks today.      Dennis Is progressing well towards his goals.   Pt prognosis is Good.     Pt will continue to benefit from skilled outpatient physical therapy to address the deficits listed in the problem list box on initial evaluation, provide pt/family education and to maximize pt's level of independence in the home and community environment.     Pt's spiritual, cultural and educational needs considered and pt agreeable to plan of care and goals.    Anticipated barriers to physical therapy: praying, compliance    GOALS: Short Term Goals:  4-6 weeks - progressing not met  1.Report decreased knee pain  < / =  3/10  to increase tolerance for ambulation and work acitivities  2. Increase knee ROM to full passive in order to be able to perform ADLs without difficulty.  3. Increase strength by 1/3 MMT grade in quad  to increase tolerance for ADL and work activities.  4. Pt to tolerate HEP to improve ROM and independence with ADL's     Long Term Goals: 8-12 weeks - progressing not met  1.Report decreased knee pain < / = 2/10  to increase tolerance for ambulation and work  2.Patient goal: return to pain free ambulation and work activities to take care of kids  3.Increase strength to >/= 4+/5 in quad   to increase tolerance for ADL and work activities.  4. Pt will report at CJ level (20-40% impaired) on FOTO knee to demonstrate increase in LE function with every day tasks.    Plan   Plan of care Certification: 5/18/2022 to 8/18/22.    Progress with POC with quad control, knee range of motion, functional activities, and pain reduction activities.     Nabil García, PT

## 2022-06-06 ENCOUNTER — CLINICAL SUPPORT (OUTPATIENT)
Dept: REHABILITATION | Facility: HOSPITAL | Age: 47
End: 2022-06-06
Payer: MEDICAID

## 2022-06-06 DIAGNOSIS — M25.561 CHRONIC PAIN OF RIGHT KNEE: ICD-10-CM

## 2022-06-06 DIAGNOSIS — M25.661 DECREASED RANGE OF MOTION (ROM) OF RIGHT KNEE: Primary | ICD-10-CM

## 2022-06-06 DIAGNOSIS — G89.29 CHRONIC PAIN OF RIGHT KNEE: ICD-10-CM

## 2022-06-06 PROCEDURE — 97110 THERAPEUTIC EXERCISES: CPT | Mod: PN

## 2022-06-06 NOTE — PROGRESS NOTES
"      Physical Therapy Daily Treatment Note     Name: Dennis Oden  Clinic Number: [id number]    Therapy Diagnosis:   Encounter Diagnoses   Name Primary?    Decreased range of motion (ROM) of right knee Yes    Chronic pain of right knee      Physician: Janelle Taveras PA    Visit Date: 6/6/2022  Physician Orders: PT Eval and Treat   Medical Diagnosis from Referral: Z98.890 (ICD-10-CM) - Status post arthroscopy of right knee  Evaluation Date: 5/18/2022  Authorization Period Expiration: 6/5/22  Plan of Care Expiration: 8/18/22  Progress Note Due: 6/18/22  Visit # / Visits authorized: 4/20  FOTO: 65% limitation  FOTO 1st follow up:  FOTO 2nd follow up:     Precautions: Standard, meniscectomy 4/22/22 5/6/22     Time In: 10:47  Time Out: 11:32  Total Appointment Time (timed & untimed codes): 45 minutes    Subjective     Pt reports: still having difficulty with walking/squatting.     He was compliant with home exercise program.  Response to previous treatment: improved range of motion  Functional change: improved gait     Pain: 5/10  Location: right knee      Objective     Observation: lumbar spine decreased SB on right, decreased ext, closing on right side    Lumbar segmental mobility: hypomobile in upper lumbar spine    Nerve tension:  SLR: negative  Femoral: negative    **All exercises billed as therapeutic exercises per medicaid guidelines**    PT technician assisted with treatment under direct supervision of PT       Dennis received therapeutic exercises to develop strength, endurance and ROM for 18 minutes including:  Quad sets 20x5"  Short arc quad on 1/2 foam roll 3x10   Terminal knee extension with Green Theraband 3x10   Double leg press 100# 3x15  Single leg press 50# 3x10 each    Not Performed  Long arc quad  2x15   Clamshells with Green Theraband 3x12 each side   Lateral band walks green TB at window 3 rounds of 4 laps    Heel prop 5'  Hip abd 2x10 each    Supine heel slides x10   Short arc quad  On " full boalster 3x10     Dennis received the following manual therapy techniques: Joint mobilizations were applied to the: knee for 25 minutes, including:  Lumbar assessment  Lumbar gapping distraction Grades I-IV  Patella mobilizations   Fat pad mobilizations  Posterior tibial glides with inferior patellar glide grade III    Home Exercises Provided and Patient Education Provided     Education provided:   - HEP  - importance of quad control  - modifying position for praying    Written Home Exercises Provided: yes.  Exercises were reviewed and Dennis was able to demonstrate them prior to the end of the session.  Dennis demonstrated good  understanding of the education provided.     See EMR under Patient Instructions for exercises provided prior visit.    Assessment     Dennis still having difficulty with quad control and SLR. Lumbar assessment showed upper lumbar hypomobility. Slight improvement with SLR following manual techniques. Educated on bringing shorts next visit to perform NMES. Continue to progress.     Dennis Is progressing well towards his goals.   Pt prognosis is Good.     Pt will continue to benefit from skilled outpatient physical therapy to address the deficits listed in the problem list box on initial evaluation, provide pt/family education and to maximize pt's level of independence in the home and community environment.     Pt's spiritual, cultural and educational needs considered and pt agreeable to plan of care and goals.    Anticipated barriers to physical therapy: praying, compliance    GOALS: Short Term Goals:  4-6 weeks - progressing not met  1.Report decreased knee pain  < / =  3/10  to increase tolerance for ambulation and work acitivities  2. Increase knee ROM to full passive in order to be able to perform ADLs without difficulty.  3. Increase strength by 1/3 MMT grade in quad  to increase tolerance for ADL and work activities.  4. Pt to tolerate HEP to improve ROM and independence  with ADL's     Long Term Goals: 8-12 weeks - progressing not met  1.Report decreased knee pain < / = 2/10  to increase tolerance for ambulation and work  2.Patient goal: return to pain free ambulation and work activities to take care of kids  3.Increase strength to >/= 4+/5 in quad  to increase tolerance for ADL and work activities.  4. Pt will report at CJ level (20-40% impaired) on FOTO knee to demonstrate increase in LE function with every day tasks.    Plan   Plan of care Certification: 5/18/2022 to 8/18/22.    Progress with POC with quad control, knee range of motion, functional activities, and pain reduction activities.     Nabil García, PT     Co-Treatment with Arsenio Hernández

## 2022-06-13 ENCOUNTER — CLINICAL SUPPORT (OUTPATIENT)
Dept: REHABILITATION | Facility: HOSPITAL | Age: 47
End: 2022-06-13
Payer: MEDICAID

## 2022-06-13 DIAGNOSIS — G89.29 CHRONIC PAIN OF RIGHT KNEE: ICD-10-CM

## 2022-06-13 DIAGNOSIS — M25.561 CHRONIC PAIN OF RIGHT KNEE: ICD-10-CM

## 2022-06-13 DIAGNOSIS — M25.661 DECREASED RANGE OF MOTION (ROM) OF RIGHT KNEE: Primary | ICD-10-CM

## 2022-06-13 PROCEDURE — 97110 THERAPEUTIC EXERCISES: CPT | Mod: PN

## 2022-06-13 NOTE — PROGRESS NOTES
"      Physical Therapy Daily Treatment Note     Name: Dennis Oden  Clinic Number: [id number]    Therapy Diagnosis:   Encounter Diagnoses   Name Primary?    Decreased range of motion (ROM) of right knee Yes    Chronic pain of right knee      Physician: Janelle Taveras PA    Visit Date: 6/13/2022  Physician Orders: PT Eval and Treat   Medical Diagnosis from Referral: Z98.890 (ICD-10-CM) - Status post arthroscopy of right knee  Evaluation Date: 5/18/2022  Authorization Period Expiration: 6/5/22  Plan of Care Expiration: 8/18/22  Progress Note Due: 6/18/22  Visit # / Visits authorized: 5/20  FOTO: 65% limitation  FOTO 1st follow up: 48% limitation  FOTO 2nd follow up:     Precautions: Standard, meniscectomy 4/22/22 5/6/22     Time In: 9:04  Time Out: 10:00  Total Appointment Time (timed & untimed codes): 56 minutes    Subjective     Pt reports: felt like his knee was going to give out a couple times last week.     He was compliant with home exercise program.  Response to previous treatment: improved range of motion  Functional change: improved gait     Pain: 5/10  Location: right knee      Objective     **All exercises billed as therapeutic exercises per medicaid guidelines**    PT technician assisted with treatment under direct supervision of PT     Dennis received therapeutic exercises to develop strength, endurance and ROM for 47 minutes including:  NMES 10" on and 10"   Quad sets with towel 7'   SAQ 7'   SLR off the edge of the table 5'  NuStep level 5 for 5' for range of motion, strength, and cardiovascular endurance    Terminal knee extension with Green Theraband 3x10   Double leg press 75# 3x15  Single leg press 50# 3x10 each  Lateral band walks green TB 3 laps    Not Performed  Quad sets 20x5"  Short arc quad on 1/2 foam roll 3x10   Long arc quad  2x15   Clamshells with Green Theraband 3x12 each side   Lateral band walks green TB at window 3 rounds of 4 laps    Heel prop 5'  Hip abd 2x10 each    Supine " heel slides x10   Short arc quad  On full boalster 3x10     Dennis received the following manual therapy techniques: Joint mobilizations were applied to the: knee for 10 minutes, including:  Patella mobilizations   Fat pad mobilizations    Not performed  Posterior tibial glides with inferior patellar glide grade III    Home Exercises Provided and Patient Education Provided     Education provided:   - HEP  - importance of quad control  - modifying position for praying    Written Home Exercises Provided: yes.  Exercises were reviewed and Dennis was able to demonstrate them prior to the end of the session.  Dennis demonstrated good  understanding of the education provided.     See EMR under Patient Instructions for exercises provided prior visit.    Assessment     Dennis presented today will full pain free knee range of motion. Performed NMES to facilitate quad activation with some improvement. Attempted step ups but had some pain so held today. Continued education on keeping pain to minimal and importance of HEP. Continue to progress as tolerated.     Dennis Is progressing well towards his goals.   Pt prognosis is Good.     Pt will continue to benefit from skilled outpatient physical therapy to address the deficits listed in the problem list box on initial evaluation, provide pt/family education and to maximize pt's level of independence in the home and community environment.     Pt's spiritual, cultural and educational needs considered and pt agreeable to plan of care and goals.    Anticipated barriers to physical therapy: praying, compliance    GOALS: Short Term Goals:  4-6 weeks - progressing not met  1.Report decreased knee pain  < / =  3/10  to increase tolerance for ambulation and work acitivities  2. Increase knee ROM to full passive in order to be able to perform ADLs without difficulty.  3. Increase strength by 1/3 MMT grade in quad  to increase tolerance for ADL and work activities.  4. Pt to  tolerate HEP to improve ROM and independence with ADL's     Long Term Goals: 8-12 weeks - progressing not met  1.Report decreased knee pain < / = 2/10  to increase tolerance for ambulation and work  2.Patient goal: return to pain free ambulation and work activities to take care of kids  3.Increase strength to >/= 4+/5 in quad  to increase tolerance for ADL and work activities.  4. Pt will report at CJ level (20-40% impaired) on FOTO knee to demonstrate increase in LE function with every day tasks.    Plan   Plan of care Certification: 5/18/2022 to 8/18/22.    Progress with POC with quad control, knee range of motion, functional activities, and pain reduction activities.     Nabil García, PT

## 2022-07-12 ENCOUNTER — CLINICAL SUPPORT (OUTPATIENT)
Dept: REHABILITATION | Facility: HOSPITAL | Age: 47
End: 2022-07-12
Payer: MEDICAID

## 2022-07-12 DIAGNOSIS — G89.29 CHRONIC PAIN OF RIGHT KNEE: ICD-10-CM

## 2022-07-12 DIAGNOSIS — M25.661 DECREASED RANGE OF MOTION (ROM) OF RIGHT KNEE: Primary | ICD-10-CM

## 2022-07-12 DIAGNOSIS — M25.561 CHRONIC PAIN OF RIGHT KNEE: ICD-10-CM

## 2022-07-12 PROCEDURE — 97110 THERAPEUTIC EXERCISES: CPT | Mod: PN

## 2022-07-12 NOTE — PROGRESS NOTES
Physical Therapy Progress Note     Name: Dennis Oden  Clinic Number: [id number]    Therapy Diagnosis:   Encounter Diagnoses   Name Primary?    Decreased range of motion (ROM) of right knee Yes    Chronic pain of right knee      Physician: Janelle Taveras PA    Visit Date: 7/12/2022  Physician Orders: PT Eval and Treat   Medical Diagnosis from Referral: Z98.890 (ICD-10-CM) - Status post arthroscopy of right knee  Evaluation Date: 5/18/2022  Authorization Period Expiration: 6/5/22  Plan of Care Expiration: 8/18/22  Progress Note Due: 8/12/22  Visit # / Visits authorized: 6/20  FOTO: 65% limitation  FOTO 1st follow up: 48% limitation  FOTO 2nd follow up:     Precautions: Standard, meniscectomy 4/22/22 5/6/22     Time In: 13:00  Time Out: 14:00  Total Appointment Time (timed & untimed codes): 60 minutes    Subjective     Pt reports: returning after a couple weeks due to difficulties with insurance. Still having pain with ambulating, stairs, and running.      He was compliant with home exercise program.  Response to previous treatment: improved range of motion  Functional change: improved gait     Pain: 5/10  Location: right knee      Objective     Observation: mild edema, normal patella mobility, ambulation with right antalgic gait, decreased WB on right.      Range of Motion (Passive):   Knee Right  Left    Flexion 120* 130   Extension 0  0   * indicated pain at end range     Lower Extremity Strength  Right LE   Left LE     Quadriceps: 3+/5 Quadriceps: 5/5   Hamstrings: 4/5 Hamstrings: 4/5   Hip flexion (seated): 4-/5 Hip flexion (seated): 4/5   Hip extension:  NT Hip extension: NT   Hip abd: 3-/5 Hip Abd:  4/5   Hip ER:  NT Hip ER:  NT   Hip IR: NT Hip IR: NT       **All exercises billed as therapeutic exercises per medicaid guidelines**    PT technician assisted with treatment under direct supervision of PT     Dennis received therapeutic exercises to develop strength, endurance and ROM for 42 minutes  "including:    Quad sets 40x5" with towel  LAQ modified range 30x  TKE green TB 30x5"  S/l hip abd 3x10  Lateral band walks green TB 3 laps 10 yards   Double leg press 75# 3x15  Single leg press 37# 3x10  Hip abd machine 60# 3x10  Hip add machine 50# 3x10    Not Performed  Heel prop 5'  Hip abd 2x10 each  Supine heel slides x10   Short arc quad  On full boalster 3x10   NMES 10" on and 10"   Quad sets with towel 7'   SAQ 7'   SLR off the edge of the table 5'  NuStep level 5 for 5' for range of motion, strength, and cardiovascular endurance    Dennis received the following manual therapy techniques: Joint mobilizations were applied to the: knee for 14 minutes, including:  Knee assessment  Patella mobilizations   Fat pad mobilizations    Not performed  Posterior tibial glides with inferior patellar glide grade III    Home Exercises Provided and Patient Education Provided     Education provided:   - HEP  - importance of quad control  - modifying position for praying    Written Home Exercises Provided: yes.  Exercises were reviewed and Dennis was able to demonstrate them prior to the end of the session.  Dennis demonstrated good  understanding of the education provided.     See EMR under Patient Instructions for exercises provided prior visit.    Assessment     Dennis presented following absence due to insurance difficulties. Was not performing much at home during the time. Still presenting with decreased knee flex range, decreased quad strength, abnormal gait, and pain during ambulation. Focused on quad strength today with hip/glute activation and provided updated HEP for at home. Continue to progress as tolerated.     Dennis Is progressing well towards his goals.   Pt prognosis is Good.     Pt will continue to benefit from skilled outpatient physical therapy to address the deficits listed in the problem list box on initial evaluation, provide pt/family education and to maximize pt's level of independence in " the home and community environment.     Pt's spiritual, cultural and educational needs considered and pt agreeable to plan of care and goals.    Anticipated barriers to physical therapy: praying, compliance    GOALS: Short Term Goals:  4-6 weeks - progressing not met  1.Report decreased knee pain  < / =  3/10  to increase tolerance for ambulation and work acitivities  2. Increase knee ROM to full passive in order to be able to perform ADLs without difficulty.  3. Increase strength by 1/3 MMT grade in quad  to increase tolerance for ADL and work activities.  4. Pt to tolerate HEP to improve ROM and independence with ADL's     Long Term Goals: 8-12 weeks - progressing not met  1.Report decreased knee pain < / = 2/10  to increase tolerance for ambulation and work  2.Patient goal: return to pain free ambulation and work activities to take care of kids  3.Increase strength to >/= 4+/5 in quad  to increase tolerance for ADL and work activities.  4. Pt will report at CJ level (20-40% impaired) on FOTO knee to demonstrate increase in LE function with every day tasks.    Plan   Plan of care Certification: 5/18/2022 to 8/18/22.    Progress with POC with quad control, knee range of motion, functional activities, and pain reduction activities.     Nabil García, PT

## 2022-07-14 ENCOUNTER — CLINICAL SUPPORT (OUTPATIENT)
Dept: REHABILITATION | Facility: HOSPITAL | Age: 47
End: 2022-07-14
Payer: MEDICAID

## 2022-07-14 DIAGNOSIS — M25.661 DECREASED RANGE OF MOTION (ROM) OF RIGHT KNEE: Primary | ICD-10-CM

## 2022-07-14 DIAGNOSIS — M25.561 CHRONIC PAIN OF RIGHT KNEE: ICD-10-CM

## 2022-07-14 DIAGNOSIS — G89.29 CHRONIC PAIN OF RIGHT KNEE: ICD-10-CM

## 2022-07-14 PROCEDURE — 97110 THERAPEUTIC EXERCISES: CPT | Mod: PN,CQ

## 2022-07-14 NOTE — PROGRESS NOTES
"  Physical Therapy Progress Note     Name: Dennis Oden  Clinic Number: [id number]    Therapy Diagnosis:   Encounter Diagnoses   Name Primary?    Decreased range of motion (ROM) of right knee Yes    Chronic pain of right knee      Physician: Janelle Taveras PA    Visit Date: 7/14/2022  Physician Orders: PT Eval and Treat   Medical Diagnosis from Referral: Z98.890 (ICD-10-CM) - Status post arthroscopy of right knee  Evaluation Date: 5/18/2022  Authorization Period Expiration: 6/5/22  Plan of Care Expiration: 8/18/22  Progress Note Due: 8/12/22  Visit # / Visits authorized: 7/20  FOTO: 65% limitation  FOTO 1st follow up: 48% limitation  FOTO 2nd follow up:     Precautions: Standard, meniscectomy 4/22/22 5/6/22     Time In: 1000  Time Out: 1100  Total Appointment Time (timed & untimed codes): 60 minutes    Subjective     Pt reports: He attempted walking 3 miles and tried to run a little bit but was unable to. Expresses concerns about being able to return to run.     He was compliant with home exercise program.  Response to previous treatment: improved range of motion  Functional change: improved gait     Pain: 2/10  Location: right knee      Objective       Dennis received therapeutic exercises to develop strength, endurance and ROM for 42 minutes including:    Quad sets 40x5" with towel  LAQ modified range 30x  TKE green TB 30x5"  S/l hip abd 3x10  Lateral band walks green TB 3 laps 10 yards   Double leg press 75# 3x15  Single leg press 37# 3x10  Hip abd machine 60# 3x10  Hip add machine 50# 3x10  Prone hip extension 2 x 15 repetitions   NMES 10" on and 10"   Quad sets with towel 7'   SAQ 7'   Quad set followed by straight leg raises x 30 repetitions    SLR off the edge of the table 5'        Not Performed  Heel prop 5'  Hip abd 2x10 each  Supine heel slides x10   Short arc quad  On full boalster 3x10   NuStep level 5 for 5' for range of motion, strength, and cardiovascular endurance    Dennis received the " following manual therapy techniques: Joint mobilizations were applied to the: knee for 14 minutes, including:  Knee assessment  Patella mobilizations   Fat pad mobilizations    Not performed  Posterior tibial glides with inferior patellar glide grade III    Home Exercises Provided and Patient Education Provided     Education provided:   - HEP  - importance of quad control  - modifying position for praying    Written Home Exercises Provided: yes.  Exercises were reviewed and Dennis was able to demonstrate them prior to the end of the session.  Dennis demonstrated good  understanding of the education provided.     See EMR under Patient Instructions for exercises provided prior visit.    Assessment     Patient tolerated treatment well. Resumed neuromuscular electrical stimulation again this date with good response noted. During straight leg raises off edge of mat he required cueing to improve quad set during range of motion and responded well with improvement noted and sustained. Patient will continue to benefit from skilled Physical Therapy to improve strength and range of motion deficits to allow him to return to recreational activities without pain or limitations.     Dennis Is progressing well towards his goals.   Pt prognosis is Good.     Pt will continue to benefit from skilled outpatient physical therapy to address the deficits listed in the problem list box on initial evaluation, provide pt/family education and to maximize pt's level of independence in the home and community environment.     Pt's spiritual, cultural and educational needs considered and pt agreeable to plan of care and goals.    Anticipated barriers to physical therapy: praying, compliance    GOALS: Short Term Goals:  4-6 weeks - progressing not met  1.Report decreased knee pain  < / =  3/10  to increase tolerance for ambulation and work acitivities  2. Increase knee ROM to full passive in order to be able to perform ADLs without  difficulty.  3. Increase strength by 1/3 MMT grade in quad  to increase tolerance for ADL and work activities.  4. Pt to tolerate HEP to improve ROM and independence with ADL's     Long Term Goals: 8-12 weeks - progressing not met  1.Report decreased knee pain < / = 2/10  to increase tolerance for ambulation and work  2.Patient goal: return to pain free ambulation and work activities to take care of kids  3.Increase strength to >/= 4+/5 in quad  to increase tolerance for ADL and work activities.  4. Pt will report at CJ level (20-40% impaired) on FOTO knee to demonstrate increase in LE function with every day tasks.    Plan   Plan of care Certification: 5/18/2022 to 8/18/22.    Progress with POC with quad control, knee range of motion, functional activities, and pain reduction activities.     Lauren Macias, PTA

## 2022-07-18 ENCOUNTER — CLINICAL SUPPORT (OUTPATIENT)
Dept: REHABILITATION | Facility: HOSPITAL | Age: 47
End: 2022-07-18
Payer: MEDICAID

## 2022-07-18 DIAGNOSIS — M25.561 CHRONIC PAIN OF RIGHT KNEE: ICD-10-CM

## 2022-07-18 DIAGNOSIS — G89.29 CHRONIC PAIN OF RIGHT KNEE: ICD-10-CM

## 2022-07-18 DIAGNOSIS — M25.661 DECREASED RANGE OF MOTION (ROM) OF RIGHT KNEE: Primary | ICD-10-CM

## 2022-07-18 PROCEDURE — 97110 THERAPEUTIC EXERCISES: CPT | Mod: PN

## 2022-07-18 NOTE — PROGRESS NOTES
"  Physical Therapy Progress Note     Name: Dennis Oden  Clinic Number: [id number]    Therapy Diagnosis:   Encounter Diagnoses   Name Primary?    Decreased range of motion (ROM) of right knee Yes    Chronic pain of right knee      Physician: Janelle Taveras PA    Visit Date: 7/18/2022  Physician Orders: PT Eval and Treat   Medical Diagnosis from Referral: Z98.890 (ICD-10-CM) - Status post arthroscopy of right knee  Evaluation Date: 5/18/2022  Authorization Period Expiration: 6/5/22  Plan of Care Expiration: 8/18/22  Progress Note Due: 8/12/22  Visit # / Visits authorized: 8/20  FOTO: 65% limitation  FOTO 1st follow up: 48% limitation  FOTO 2nd follow up: 45% limitation     Precautions: Standard, meniscectomy 4/22/22 5/6/22     Time In: 10:05  Time Out: 11:00  Total Appointment Time (timed & untimed codes): 55 minutes    Subjective     Pt reports: feeling some pain today in the knee and with bending/kneeling.     He was compliant with home exercise program.  Response to previous treatment: improved range of motion  Functional change: improved gait     Pain: 2/10  Location: right knee      Objective       Dennis received therapeutic exercises to develop strength, endurance and ROM for 41 minutes including:    Bridges 20x5"  SLR with towel 3x5  Double leg press 75# 3x15  Single leg press 37# 3x10  Lateral band walks blue band 3 laps 10 yards  NMES 10" on and 10"   Quad sets with towel 6'   LAQ 6' 90-45   SLR off the edge of the table 6    LAQ modified range 30x  TKE green TB 30x5"  S/l hip abd 3x10  Lateral band walks green TB 3 laps 10 yards   Hip abd machine 60# 3x10  Hip add machine 50# 3x10  Prone hip extension 2 x 15 repetitions           Not Performed  Heel prop 5'  Hip abd 2x10 each  Supine heel slides x10   Short arc quad  On full boalster 3x10   NuStep level 5 for 5' for range of motion, strength, and cardiovascular endurance    Dennis received the following manual therapy techniques: Joint " mobilizations were applied to the: knee for 14 minutes, including:  Knee assessment  Patella mobilizations   Fat pad mobilizations    Not performed  Posterior tibial glides with inferior patellar glide grade III    Home Exercises Provided and Patient Education Provided     Education provided:   - HEP  - importance of quad control  - modifying position for praying    Written Home Exercises Provided: yes.  Exercises were reviewed and Dennis was able to demonstrate them prior to the end of the session.  Dennis demonstrated good  understanding of the education provided.     See EMR under Patient Instructions for exercises provided prior visit.    Assessment     Patient presented with increased edema on right knee today. Performed NMES and showed improved quad activation. Continued to educate about importance of decreasing activities that increase symptoms to the knee. Continued to progress exercises as tolerated.     Dennis Is progressing well towards his goals.   Pt prognosis is Good.     Pt will continue to benefit from skilled outpatient physical therapy to address the deficits listed in the problem list box on initial evaluation, provide pt/family education and to maximize pt's level of independence in the home and community environment.     Pt's spiritual, cultural and educational needs considered and pt agreeable to plan of care and goals.    Anticipated barriers to physical therapy: praying, compliance    GOALS: Short Term Goals:  4-6 weeks - progressing not met  1.Report decreased knee pain  < / =  3/10  to increase tolerance for ambulation and work acitivities  2. Increase knee ROM to full passive in order to be able to perform ADLs without difficulty.  3. Increase strength by 1/3 MMT grade in quad  to increase tolerance for ADL and work activities.  4. Pt to tolerate HEP to improve ROM and independence with ADL's     Long Term Goals: 8-12 weeks - progressing not met  1.Report decreased knee pain < / =  2/10  to increase tolerance for ambulation and work  2.Patient goal: return to pain free ambulation and work activities to take care of kids  3.Increase strength to >/= 4+/5 in quad  to increase tolerance for ADL and work activities.  4. Pt will report at CJ level (20-40% impaired) on FOTO knee to demonstrate increase in LE function with every day tasks.    Plan   Plan of care Certification: 5/18/2022 to 8/18/22.    Progress with POC with quad control, knee range of motion, functional activities, and pain reduction activities.     Nabil García, PT

## 2022-07-21 ENCOUNTER — DOCUMENTATION ONLY (OUTPATIENT)
Dept: REHABILITATION | Facility: HOSPITAL | Age: 47
End: 2022-07-21

## 2022-07-21 ENCOUNTER — CLINICAL SUPPORT (OUTPATIENT)
Dept: REHABILITATION | Facility: HOSPITAL | Age: 47
End: 2022-07-21
Payer: MEDICAID

## 2022-07-21 DIAGNOSIS — M25.561 CHRONIC PAIN OF RIGHT KNEE: ICD-10-CM

## 2022-07-21 DIAGNOSIS — M25.661 DECREASED RANGE OF MOTION (ROM) OF RIGHT KNEE: Primary | ICD-10-CM

## 2022-07-21 DIAGNOSIS — G89.29 CHRONIC PAIN OF RIGHT KNEE: ICD-10-CM

## 2022-07-21 PROCEDURE — 97110 THERAPEUTIC EXERCISES: CPT | Mod: PN,CQ

## 2022-07-21 NOTE — PROGRESS NOTES
"  Physical Therapy Progress Note     Name: Dennis Oden  Clinic Number: [id number]    Therapy Diagnosis:   Encounter Diagnoses   Name Primary?    Decreased range of motion (ROM) of right knee Yes    Chronic pain of right knee      Physician: Janelle Taveras PA    Visit Date: 7/21/2022  Physician Orders: PT Eval and Treat   Medical Diagnosis from Referral: Z98.890 (ICD-10-CM) - Status post arthroscopy of right knee  Evaluation Date: 5/18/2022  Authorization Period Expiration: 6/5/22  Plan of Care Expiration: 8/18/22  Progress Note Due: 8/12/22  Visit # / Visits authorized: 9/20  FOTO: 65% limitation  FOTO 1st follow up: 48% limitation  FOTO 2nd follow up: 45% limitation     Precautions: Standard, meniscectomy 4/22/22 5/6/22     Time In: 10:20  Time Out: 11:00  Total Appointment Time (timed & untimed codes): 40 minutes    Subjective     Pt reports: every time he sits down after a while when he stands his knee feels stiff.     He was compliant with home exercise program.  Response to previous treatment: improved range of motion  Functional change: improved gait     Pain: 6/10  Location: right knee      Objective       Dennis received therapeutic exercises to develop strength, endurance and ROM for 30 minutes including:    Bridges 20x5"  SLR with towel 3x5  Double leg press 75# 3x15  Single leg press 37# 3x10  Lateral band walks blue band 3 laps 10 yards  NMES 10" on and 10" (neuromuscular electrical stimulation not used this date)   Quad sets with towel 6'   LAQ 6' 90-45   SLR off the edge of the table 6    LAQ modified range 30x  TKE green TB 30x5"  S/l hip abd 3x10  Lateral band walks green TB 3 laps 10 yards   Hip abd machine 60# 3x10  Hip add machine 50# 3x10  Prone hip extension 2 x 15 repetitions           Not Performed  Heel prop 5'  Hip abd 2x10 each  Supine heel slides x10   Short arc quad  On full boalster 3x10   NuStep level 5 for 5' for range of motion, strength, and cardiovascular " endurance    Dennis received the following manual therapy techniques: Joint mobilizations were applied to the: knee for 10 minutes, including:  Knee assessment  Patella mobilizations   Fat pad mobilizations    Not performed  Posterior tibial glides with inferior patellar glide grade III    Home Exercises Provided and Patient Education Provided     Education provided:   - HEP  - importance of quad control  - modifying position for praying    Written Home Exercises Provided: yes.  Exercises were reviewed and Dennis was able to demonstrate them prior to the end of the session.  Dennis demonstrated good  understanding of the education provided.     See EMR under Patient Instructions for exercises provided prior visit.    Assessment     Patient noted with significant improvement in quad control this date as he was able to perform straight leg raises without extensor lag noted making good progress towards his goals. Patient will continue to benefit from skilled Physical Therapy to improve remaining strength deficits to allow him to return to activities of daily living without pain or limitations.     Dennis Is progressing well towards his goals.   Pt prognosis is Good.     Pt will continue to benefit from skilled outpatient physical therapy to address the deficits listed in the problem list box on initial evaluation, provide pt/family education and to maximize pt's level of independence in the home and community environment.     Pt's spiritual, cultural and educational needs considered and pt agreeable to plan of care and goals.    Anticipated barriers to physical therapy: praying, compliance    GOALS: Short Term Goals:  4-6 weeks - progressing not met  1.Report decreased knee pain  < / =  3/10  to increase tolerance for ambulation and work acitivities  2. Increase knee ROM to full passive in order to be able to perform ADLs without difficulty.  3. Increase strength by 1/3 MMT grade in quad  to increase tolerance  for ADL and work activities.  4. Pt to tolerate HEP to improve ROM and independence with ADL's     Long Term Goals: 8-12 weeks - progressing not met  1.Report decreased knee pain < / = 2/10  to increase tolerance for ambulation and work  2.Patient goal: return to pain free ambulation and work activities to take care of kids  3.Increase strength to >/= 4+/5 in quad  to increase tolerance for ADL and work activities.  4. Pt will report at CJ level (20-40% impaired) on FOTO knee to demonstrate increase in LE function with every day tasks.    Plan   Plan of care Certification: 5/18/2022 to 8/18/22.    Progress with POC with quad control, knee range of motion, functional activities, and pain reduction activities.     Lauren Macias, PTA

## 2022-07-25 ENCOUNTER — CLINICAL SUPPORT (OUTPATIENT)
Dept: REHABILITATION | Facility: HOSPITAL | Age: 47
End: 2022-07-25
Payer: MEDICAID

## 2022-07-25 DIAGNOSIS — G89.29 CHRONIC PAIN OF RIGHT KNEE: ICD-10-CM

## 2022-07-25 DIAGNOSIS — M25.661 DECREASED RANGE OF MOTION (ROM) OF RIGHT KNEE: Primary | ICD-10-CM

## 2022-07-25 DIAGNOSIS — M25.561 CHRONIC PAIN OF RIGHT KNEE: ICD-10-CM

## 2022-07-25 PROCEDURE — 97110 THERAPEUTIC EXERCISES: CPT | Mod: PN,CQ

## 2022-07-25 NOTE — PROGRESS NOTES
"  Physical Therapy Progress Note     Name: Dennis Oden  Clinic Number: [id number]    Therapy Diagnosis:   Encounter Diagnoses   Name Primary?    Decreased range of motion (ROM) of right knee Yes    Chronic pain of right knee      Physician: Janelle Taveras PA    Visit Date: 7/25/2022  Physician Orders: PT Eval and Treat   Medical Diagnosis from Referral: Z98.890 (ICD-10-CM) - Status post arthroscopy of right knee  Evaluation Date: 5/18/2022  Authorization Period Expiration: 6/5/22  Plan of Care Expiration: 8/18/22  Progress Note Due: 8/12/22  Visit # / Visits authorized: 10/20  FOTO: 65% limitation  FOTO 1st follow up: 48% limitation  FOTO 2nd follow up: 45% limitation     Precautions: Standard, meniscectomy 4/22/22 5/6/22     Time In: 10:30  Time Out: 11:30  Total Appointment Time (timed & untimed codes): 55 minutes    Subjective     Pt reports: Still struggles with mild pain and expresses continued desire to return to running.     He was compliant with home exercise program.  Response to previous treatment: improved range of motion  Functional change: improved gait     Pain: 6/10  Location: right knee      Objective     Physical Therapy technician assisted with parts of today's treatment under the supervision of the PTA     Dennis received therapeutic exercises to develop strength, endurance and ROM for 45 minutes including:    Bridges 20x5"  SLR with towel 3x5  Double leg press 75# 3x15  Single leg press 37# 3x10  Lateral band walks blue band 3 laps 10 yards  NMES 10" on and 10" (neuromuscular electrical stimulation not used this date)   Quad sets with towel 6'   LAQ 6' 90-45   SLR off the edge of the table 6    LAQ modified range 30x  TKE green TB 30x5"  S/l hip abd 3x10  Lateral band walks green TB 3 laps 10 yards   Hip abd machine 60# 3x10  Hip add machine 50# 3x10  Prone hip extension 2 x 15 repetitions           Not Performed  Heel prop 5'  Hip abd 2x10 each  Supine heel slides x10   Short arc quad "  On full boalster 3x10   NuStep level 5 for 5' for range of motion, strength, and cardiovascular endurance    Dennis received the following manual therapy techniques: Joint mobilizations were applied to the: knee for 10 minutes, including:  Knee assessment  Patella mobilizations   Fat pad mobilizations    Not performed  Posterior tibial glides with inferior patellar glide grade III    Home Exercises Provided and Patient Education Provided     Education provided:   - HEP  - importance of quad control  - modifying position for praying    Written Home Exercises Provided: yes.  Exercises were reviewed and Dennis was able to demonstrate them prior to the end of the session.  Dennis demonstrated good  understanding of the education provided.     See EMR under Patient Instructions for exercises provided prior visit.    Assessment     He was able to continue to perform straight leg raises without quad lag this date. Expressed discomfort in deep flexion during shuttle. Patient will continue to benefit from skilled Physical Therapy to maximize right lower extremity strength to allow him to return to all recreational activities as appropriate.      Dennis Is progressing well towards his goals.   Pt prognosis is Good.     Pt will continue to benefit from skilled outpatient physical therapy to address the deficits listed in the problem list box on initial evaluation, provide pt/family education and to maximize pt's level of independence in the home and community environment.     Pt's spiritual, cultural and educational needs considered and pt agreeable to plan of care and goals.    Anticipated barriers to physical therapy: praying, compliance    GOALS: Short Term Goals:  4-6 weeks - progressing not met  1.Report decreased knee pain  < / =  3/10  to increase tolerance for ambulation and work acitivities  2. Increase knee ROM to full passive in order to be able to perform ADLs without difficulty.  3. Increase strength by  1/3 MMT grade in quad  to increase tolerance for ADL and work activities.  4. Pt to tolerate HEP to improve ROM and independence with ADL's     Long Term Goals: 8-12 weeks - progressing not met  1.Report decreased knee pain < / = 2/10  to increase tolerance for ambulation and work  2.Patient goal: return to pain free ambulation and work activities to take care of kids  3.Increase strength to >/= 4+/5 in quad  to increase tolerance for ADL and work activities.  4. Pt will report at CJ level (20-40% impaired) on FOTO knee to demonstrate increase in LE function with every day tasks.    Plan   Plan of care Certification: 5/18/2022 to 8/18/22.    Progress with POC with quad control, knee range of motion, functional activities, and pain reduction activities.     Lauren Macias, PTA

## 2022-07-29 ENCOUNTER — CLINICAL SUPPORT (OUTPATIENT)
Dept: REHABILITATION | Facility: HOSPITAL | Age: 47
End: 2022-07-29
Payer: MEDICAID

## 2022-07-29 DIAGNOSIS — M25.561 CHRONIC PAIN OF RIGHT KNEE: ICD-10-CM

## 2022-07-29 DIAGNOSIS — G89.29 CHRONIC PAIN OF RIGHT KNEE: ICD-10-CM

## 2022-07-29 DIAGNOSIS — M25.661 DECREASED RANGE OF MOTION (ROM) OF RIGHT KNEE: Primary | ICD-10-CM

## 2022-07-29 PROCEDURE — 97110 THERAPEUTIC EXERCISES: CPT | Mod: PN,CQ

## 2022-07-29 NOTE — PROGRESS NOTES
"  Physical Therapy Progress Note     Name: Dennis Oden  Clinic Number: [id number]    Therapy Diagnosis:   Encounter Diagnoses   Name Primary?    Decreased range of motion (ROM) of right knee Yes    Chronic pain of right knee      Physician: Janelle Taveras PA    Visit Date: 7/29/2022  Physician Orders: PT Eval and Treat   Medical Diagnosis from Referral: Z98.890 (ICD-10-CM) - Status post arthroscopy of right knee  Evaluation Date: 5/18/2022  Authorization Period Expiration: 6/5/22  Plan of Care Expiration: 8/18/22  Progress Note Due: 8/12/22  Visit # / Visits authorized: 11/20  FOTO: 65% limitation  FOTO 1st follow up: 48% limitation  FOTO 2nd follow up: 45% limitation     Precautions: Standard, meniscectomy 4/22/22 5/6/22     Time In: 10:00  Time Out: 11:00  Total Appointment Time (timed & untimed codes): 55 minutes    Subjective     Pt reports: "It hurts a little bit when I do to much."      He was compliant with home exercise program.  Response to previous treatment: improved range of motion  Functional change: improved gait     Pain: 5/10  Location: right knee      Objective       Dennis received therapeutic exercises to develop strength, endurance and ROM for 55 minutes including:    Bridges 20x5"  SLR with towel 3x5  TKE green TB 30x5"  Step ups with TKE green theraband x 30 repetitions   Retro stepping with green theraband around knee x 3 minutes  Lateral band walks blue band 4 laps 10 yards  Eccentric step downs with blue theraband x 15 repetitions    Max cueing for proper execution    Double leg press 75# 3x15  Single leg press 37# 3x10    Hip abd machine 60# 3x10  Hip add machine 50# 3x10      S/l hip abd 3x10  Prone hip extension 2 x 15 repetitions     Dennis received the following manual therapy techniques: Joint mobilizations were applied to the: knee for 10 minutes, including:  Knee assessment  Patella mobilizations   Fat pad mobilizations    Not performed  Posterior tibial glides with " inferior patellar glide grade III    Home Exercises Provided and Patient Education Provided     Education provided:   - HEP  - importance of quad control  - modifying position for praying    Written Home Exercises Provided: yes.  Exercises were reviewed and Dennis was able to demonstrate them prior to the end of the session.  Dennis demonstrated good  understanding of the education provided.     See EMR under Patient Instructions for exercises provided prior visit.    Assessment     He was able to incorporate dynamic TKE exercises well to improve quad control in weight bearing and loaded positions. He struggled with eccentric step downs and will continue to benefit from skilled Physical Therapy to improve eccentric control of right quad to normalize gait.     Dennis Is progressing well towards his goals.   Pt prognosis is Good.     Pt will continue to benefit from skilled outpatient physical therapy to address the deficits listed in the problem list box on initial evaluation, provide pt/family education and to maximize pt's level of independence in the home and community environment.     Pt's spiritual, cultural and educational needs considered and pt agreeable to plan of care and goals.    Anticipated barriers to physical therapy: praying, compliance    GOALS: Short Term Goals:  4-6 weeks - progressing not met  1.Report decreased knee pain  < / =  3/10  to increase tolerance for ambulation and work acitivities  2. Increase knee ROM to full passive in order to be able to perform ADLs without difficulty.  3. Increase strength by 1/3 MMT grade in quad  to increase tolerance for ADL and work activities.  4. Pt to tolerate HEP to improve ROM and independence with ADL's     Long Term Goals: 8-12 weeks - progressing not met  1.Report decreased knee pain < / = 2/10  to increase tolerance for ambulation and work  2.Patient goal: return to pain free ambulation and work activities to take care of kids  3.Increase  strength to >/= 4+/5 in quad  to increase tolerance for ADL and work activities.  4. Pt will report at CJ level (20-40% impaired) on FOTO knee to demonstrate increase in LE function with every day tasks.    Plan   Plan of care Certification: 5/18/2022 to 8/18/22.    Progress with POC with quad control, knee range of motion, functional activities, and pain reduction activities.     Lauren Macias, PTA

## 2022-08-02 ENCOUNTER — CLINICAL SUPPORT (OUTPATIENT)
Dept: REHABILITATION | Facility: HOSPITAL | Age: 47
End: 2022-08-02
Payer: MEDICAID

## 2022-08-02 DIAGNOSIS — G89.29 CHRONIC PAIN OF RIGHT KNEE: ICD-10-CM

## 2022-08-02 DIAGNOSIS — M25.561 CHRONIC PAIN OF RIGHT KNEE: ICD-10-CM

## 2022-08-02 DIAGNOSIS — M25.661 DECREASED RANGE OF MOTION (ROM) OF RIGHT KNEE: Primary | ICD-10-CM

## 2022-08-02 PROCEDURE — 97110 THERAPEUTIC EXERCISES: CPT | Mod: PN,CQ

## 2022-08-02 NOTE — PROGRESS NOTES
"  Physical Therapy Progress Note     Name: Dennis Oden  Clinic Number: [id number]    Therapy Diagnosis:   Encounter Diagnoses   Name Primary?    Decreased range of motion (ROM) of right knee Yes    Chronic pain of right knee      Physician: Janelle Taveras PA    Visit Date: 8/2/2022  Physician Orders: PT Eval and Treat   Medical Diagnosis from Referral: Z98.890 (ICD-10-CM) - Status post arthroscopy of right knee  Evaluation Date: 5/18/2022  Authorization Period Expiration: 6/5/22  Plan of Care Expiration: 8/18/22  Progress Note Due: 8/12/22  Visit # / Visits authorized: 11/20  FOTO: 65% limitation  FOTO 1st follow up: 48% limitation  FOTO 2nd follow up: 45% limitation     Precautions: Standard, meniscectomy 4/22/22 5/6/22     Time In: 1300  Time Out: 1400  Total Appointment Time (timed & untimed codes): 60 minutes    Subjective     Pt reports: States a few sessions ago he made have overloaded his knee and is still feeling some pain      He was compliant with home exercise program.  Response to previous treatment: improved range of motion  Functional change: improved gait     Pain: 5/10  Location: right knee      Objective       Dennis received therapeutic exercises to develop strength, endurance and ROM for 50 minutes including:    Bridges 3 x 10  SLR with towel 3x5  TKE green TB 30x5"  Step ups with TKE green theraband x 30 repetitions   Retro stepping with green theraband around knee x 3 minutes  Lateral band walks blue band 4 laps 10 yards  Eccentric step downs with blue theraband x 15 repetitions    Max cueing for proper execution    Double leg press 75# 3x15  Single leg press 37# 3x10    Hip abd machine 60# 3x10  Hip add machine 50# 3x10      S/l hip abd 3x10  Prone hip extension 2 x 15 repetitions     Dennis received the following manual therapy techniques: Joint mobilizations were applied to the: knee for 10 minutes, including:  Knee assessment  Patella mobilizations   Fat pad mobilizations    Not " performed  Posterior tibial glides with inferior patellar glide grade III    Home Exercises Provided and Patient Education Provided     Education provided:   - HEP  - importance of quad control  - modifying position for praying    Written Home Exercises Provided: yes.  Exercises were reviewed and Dennis was able to demonstrate them prior to the end of the session.  Dennis demonstrated good  understanding of the education provided.     See EMR under Patient Instructions for exercises provided prior visit.    Assessment     Patient session continued to focus on quad and hip strengthening. Pt with good tolerance to therapy session with no adverse effects reported.        Dennis Is progressing well towards his goals.   Pt prognosis is Good.     Pt will continue to benefit from skilled outpatient physical therapy to address the deficits listed in the problem list box on initial evaluation, provide pt/family education and to maximize pt's level of independence in the home and community environment.     Pt's spiritual, cultural and educational needs considered and pt agreeable to plan of care and goals.    Anticipated barriers to physical therapy: praying, compliance    GOALS: Short Term Goals:  4-6 weeks - progressing not met  1.Report decreased knee pain  < / =  3/10  to increase tolerance for ambulation and work acitivities  2. Increase knee ROM to full passive in order to be able to perform ADLs without difficulty.  3. Increase strength by 1/3 MMT grade in quad  to increase tolerance for ADL and work activities.  4. Pt to tolerate HEP to improve ROM and independence with ADL's     Long Term Goals: 8-12 weeks - progressing not met  1.Report decreased knee pain < / = 2/10  to increase tolerance for ambulation and work  2.Patient goal: return to pain free ambulation and work activities to take care of kids  3.Increase strength to >/= 4+/5 in quad  to increase tolerance for ADL and work activities.  4. Pt will report  at CJ level (20-40% impaired) on FOTO knee to demonstrate increase in LE function with every day tasks.    Plan   Plan of care Certification: 5/18/2022 to 8/18/22.    Progress with POC with quad control, knee range of motion, functional activities, and pain reduction activities.     Gt Byrd, PTA

## 2022-08-04 ENCOUNTER — CLINICAL SUPPORT (OUTPATIENT)
Dept: REHABILITATION | Facility: HOSPITAL | Age: 47
End: 2022-08-04
Payer: MEDICAID

## 2022-08-04 DIAGNOSIS — M25.561 CHRONIC PAIN OF RIGHT KNEE: ICD-10-CM

## 2022-08-04 DIAGNOSIS — G89.29 CHRONIC PAIN OF RIGHT KNEE: ICD-10-CM

## 2022-08-04 DIAGNOSIS — M25.661 DECREASED RANGE OF MOTION (ROM) OF RIGHT KNEE: Primary | ICD-10-CM

## 2022-08-04 PROCEDURE — 97110 THERAPEUTIC EXERCISES: CPT | Mod: PN

## 2022-08-04 NOTE — PROGRESS NOTES
Physical Therapy Progress Note     Name: Dennis Oden  Clinic Number: [id number]    Therapy Diagnosis:   Encounter Diagnoses   Name Primary?    Decreased range of motion (ROM) of right knee Yes    Chronic pain of right knee      Physician: Janelle Taveras PA    Visit Date: 8/4/2022  Physician Orders: PT Eval and Treat   Medical Diagnosis from Referral: Z98.890 (ICD-10-CM) - Status post arthroscopy of right knee  Evaluation Date: 5/18/2022  Authorization Period Expiration: 6/5/22  Plan of Care Expiration: 8/18/22  Progress Note Due: 8/12/22  Visit # / Visits authorized: 13/20  FOTO: 65% limitation  FOTO 1st follow up: 48% limitation  FOTO 2nd follow up: 45% limitation     Precautions: Standard, meniscectomy 4/22/22 5/6/22     Time In: 9:00  Time Out: 10:00  Total Appointment Time (timed & untimed codes): 60 minutes    Subjective     Pt reports: having increased pain today when walking and moving his knee.     He was compliant with home exercise program.  Response to previous treatment: improved range of motion  Functional change: improved gait     Pain: 5/10  Location: right knee      Objective       Dennis received therapeutic exercises to develop strength, endurance and ROM for 50 minutes including:    Bridges 3 x 10  SLR with towel 3x5  Lateral band walks blue band 4 laps 10 yards   S/l hip abd 3x10  Prone hip extension 2 x 15 repetitions   Double leg press 75# 3x15  Hip abd machine 60# 3x15  Donkey kicks 25# 3x10 each  Clams with green TB 3x15 each    Not Performed  Single leg press 37# 3x10      Dennis received the following manual therapy techniques: Joint mobilizations were applied to the: knee for 10 minutes, including:  Knee assessment  Patella mobilizations   Fat pad mobilizations    Not performed  Posterior tibial glides with inferior patellar glide grade III    Home Exercises Provided and Patient Education Provided     Education provided:   - HEP  - importance of quad control  - modifying  position for praying    Written Home Exercises Provided: yes.  Exercises were reviewed and Dennis was able to demonstrate them prior to the end of the session.  Dennis demonstrated good  understanding of the education provided.     See EMR under Patient Instructions for exercises provided prior visit.    Assessment     Dennis was having increased symptoms consistent with PFPS and irritable from stepping up. Focused today on hip, glute, and quad strengthening and reduced WB exercises and step ups. Decreased pain at the end session.     Dennis Is progressing well towards his goals.   Pt prognosis is Good.     Pt will continue to benefit from skilled outpatient physical therapy to address the deficits listed in the problem list box on initial evaluation, provide pt/family education and to maximize pt's level of independence in the home and community environment.     Pt's spiritual, cultural and educational needs considered and pt agreeable to plan of care and goals.    Anticipated barriers to physical therapy: praying, compliance    GOALS: Short Term Goals:  4-6 weeks - progressing not met  1.Report decreased knee pain  < / =  3/10  to increase tolerance for ambulation and work acitivities  2. Increase knee ROM to full passive in order to be able to perform ADLs without difficulty.  3. Increase strength by 1/3 MMT grade in quad  to increase tolerance for ADL and work activities.  4. Pt to tolerate HEP to improve ROM and independence with ADL's     Long Term Goals: 8-12 weeks - progressing not met  1.Report decreased knee pain < / = 2/10  to increase tolerance for ambulation and work  2.Patient goal: return to pain free ambulation and work activities to take care of kids  3.Increase strength to >/= 4+/5 in quad  to increase tolerance for ADL and work activities.  4. Pt will report at CJ level (20-40% impaired) on FOTO knee to demonstrate increase in LE function with every day tasks.    Plan   Plan of care  Certification: 5/18/2022 to 8/18/22.    Progress with POC with quad control, knee range of motion, functional activities, and pain reduction activities.     Nbail García, PT

## 2022-08-08 ENCOUNTER — CLINICAL SUPPORT (OUTPATIENT)
Dept: REHABILITATION | Facility: HOSPITAL | Age: 47
End: 2022-08-08
Payer: MEDICAID

## 2022-08-08 DIAGNOSIS — M25.561 CHRONIC PAIN OF RIGHT KNEE: ICD-10-CM

## 2022-08-08 DIAGNOSIS — G89.29 CHRONIC PAIN OF RIGHT KNEE: ICD-10-CM

## 2022-08-08 DIAGNOSIS — M25.661 DECREASED RANGE OF MOTION (ROM) OF RIGHT KNEE: Primary | ICD-10-CM

## 2022-08-08 PROCEDURE — 97110 THERAPEUTIC EXERCISES: CPT | Mod: PN,CQ

## 2022-08-08 NOTE — PROGRESS NOTES
Physical Therapy Progress Note     Name: Dennis Oden  Clinic Number: [id number]    Therapy Diagnosis:   Encounter Diagnoses   Name Primary?    Decreased range of motion (ROM) of right knee Yes    Chronic pain of right knee      Physician: Janelle Taveras PA    Visit Date: 8/8/2022  Physician Orders: PT Eval and Treat   Medical Diagnosis from Referral: Z98.890 (ICD-10-CM) - Status post arthroscopy of right knee  Evaluation Date: 5/18/2022  Authorization Period Expiration: 6/5/22  Plan of Care Expiration: 8/18/22  Progress Note Due: 8/12/22  Visit # / Visits authorized: 14/20  FOTO: 65% limitation  FOTO 1st follow up: 48% limitation  FOTO 2nd follow up: 45% limitation     Precautions: Standard, meniscectomy 4/22/22 5/6/22     Time In: 10:35  Time Out: 11:30  Total Appointment Time (timed & untimed codes): 54 minutes    Subjective     Pt reports: 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 Also reports he is still struggling with stairs.     He was compliant with home exercise program.  Response to previous treatment: improved range of motion  Functional change: improved gait     Pain: 5/10  Location: right knee      Objective       Patient was supervised by a rehabilitation technician under the direction of the treating therapist for the last 30 minutes of treatment session.      Dennis received therapeutic exercises to develop strength, endurance and ROM for 44 minutes including:    Bridges with green theraband 3 x 10  SLR with towel 3x5  Lateral band walks blue band 4 laps 10 yards   S/l hip abd 3x10  Prone hip extension 2 x 15 repetitions   Double leg press 75# 3x15  Hip abd machine 60# 3x15  Donkey kicks 25# 3x10 each  Clams with green TB 3x15 each    Not Performed  Single leg press 37# 3x10      Dennis received the following manual therapy techniques: Joint mobilizations were applied to the: knee for 10 minutes, including:  Knee assessment  Patella  mobilizations   Fat pad mobilizations    Not performed  Posterior tibial glides with inferior patellar glide grade III    Home Exercises Provided and Patient Education Provided     Education provided:   - HEP  - importance of quad control  - modifying position for praying    Written Home Exercises Provided: yes.  Exercises were reviewed and Dennis was able to demonstrate them prior to the end of the session.  Dennis demonstrated good  understanding of the education provided.     See EMR under Patient Instructions for exercises provided prior visit.    Assessment     Dennis responds well to focus on glute and hip abductors with reduction in knee symptomology. Provided education on the importance to continue hip strengthening at home to reduce stress to his knee. Patient verbalized good response. Will progress above strength as able.     Dennis Is progressing well towards his goals.   Pt prognosis is Good.     Pt will continue to benefit from skilled outpatient physical therapy to address the deficits listed in the problem list box on initial evaluation, provide pt/family education and to maximize pt's level of independence in the home and community environment.     Pt's spiritual, cultural and educational needs considered and pt agreeable to plan of care and goals.    Anticipated barriers to physical therapy: praying, compliance    GOALS: Short Term Goals:  4-6 weeks - progressing not met  1.Report decreased knee pain  < / =  3/10  to increase tolerance for ambulation and work acitivities  2. Increase knee ROM to full passive in order to be able to perform ADLs without difficulty.  3. Increase strength by 1/3 MMT grade in quad  to increase tolerance for ADL and work activities.  4. Pt to tolerate HEP to improve ROM and independence with ADL's     Long Term Goals: 8-12 weeks - progressing not met  1.Report decreased knee pain < / = 2/10  to increase tolerance for ambulation and work  2.Patient goal: return to  pain free ambulation and work activities to take care of kids  3.Increase strength to >/= 4+/5 in quad  to increase tolerance for ADL and work activities.  4. Pt will report at CJ level (20-40% impaired) on FOTO knee to demonstrate increase in LE function with every day tasks.    Plan   Plan of care Certification: 5/18/2022 to 8/18/22.    Progress with POC with quad control, knee range of motion, functional activities, and pain reduction activities.     Lauren Macias, PTA

## 2022-10-07 DIAGNOSIS — M22.41 CHONDROMALACIA OF RIGHT PATELLA: Primary | ICD-10-CM

## 2022-10-13 ENCOUNTER — CLINICAL SUPPORT (OUTPATIENT)
Dept: REHABILITATION | Facility: HOSPITAL | Age: 47
End: 2022-10-13
Payer: MEDICAID

## 2022-10-13 DIAGNOSIS — R29.818 IMPAIRED PROPRIOCEPTION: ICD-10-CM

## 2022-10-13 DIAGNOSIS — R29.898 WEAKNESS OF RIGHT LOWER EXTREMITY: ICD-10-CM

## 2022-10-13 PROBLEM — G89.29 CHRONIC PAIN OF RIGHT KNEE: Status: RESOLVED | Noted: 2022-05-18 | Resolved: 2022-10-13

## 2022-10-13 PROBLEM — M25.561 CHRONIC PAIN OF RIGHT KNEE: Status: RESOLVED | Noted: 2022-05-18 | Resolved: 2022-10-13

## 2022-10-13 PROBLEM — M25.661 DECREASED RANGE OF MOTION (ROM) OF RIGHT KNEE: Status: RESOLVED | Noted: 2022-05-18 | Resolved: 2022-10-13

## 2022-10-13 PROCEDURE — 97110 THERAPEUTIC EXERCISES: CPT | Mod: PN

## 2022-10-13 NOTE — PROGRESS NOTES
[facility]  Physical Therapy Initial Evaluation    Name: Dennis Oden  Clinic Number: [id number]    Therapy Diagnosis:   Encounter Diagnoses   Name Primary?    Impaired proprioception     Weakness of right lower extremity      Physician: Janelle Taveras PA    Physician Orders: PT Eval and Treat   Medical Diagnosis from Referral: M22.41 (ICD-10-CM) - Chondromalacia of right patella  Evaluation Date: 10/13/2022  Authorization Period Expiration: 12/31/2022  Plan of Care Expiration: 1/13/2023 or 16v post eval  Visit # (total) / Visits authorized: 1 / eval (POC 0 / 16)  2nd FOTO visit 5  3rd FOTO visit 10  Progress Note Due: 11/13/2022    Time In: 1230  Time Out: 128  Total Billable Time: 58 minutes    Precautions: Mild back pain  Insurance: Payor: MEDICAID / Plan: [place] [employer] / Product Type: Managed Medicaid /     Subjective   Date of onset: 2 years ago; Meniscectomy 4/22/2022  History of current condition - [facility] reports: he had a surgery about 8 months ago. Was getting therapy at the [facility] location and then insurance ran out. Was not able to get back into that clinic for a few weeks so he came here. He is still having trouble with squatting, stairs, changing position, prolong standing and walking, and running. He wants to get better. Was discharged from surgeon and instructed to follow up if any future issues.       Medical History:   No past medical history on file.    Surgical History:   Dennis Oden  has no past surgical history on file.    Medications:   Dennis currently has no medications in their medication list.    Allergies:   Review of patient's allergies indicates:  No Known Allergies     Imaging, in EPIC    Prior Therapy: yes  Social History / Stairs: lives with family; 1-story  Occupation: [employer]; on feet all day  Cancer hx: no  Prior Level of Function: brace in the past  Current Level of Function: difficult/pain with work, change position,  squatting, getting up prolong positions, walk, bike    Pain:  Current 7/10, worst 8/10, best 5/10   Location: right underneath patella  Description: sharp; denies n/t   Aggravating Factors: difficult/pain with work, change position, squatting, getting up prolong positions, walk, bike  Easing Factors: bike, walk    Pts goals: to get better; return to martial arts and running    Objective     Posture Observation: Arches / GV: slight medial tilt; slight External Rotation; protrude abdominals; upper sway back posture  Palpation: NT      Gait Without AD   Analysis Mild antalgic; increased impact with Weight Bearing; flexed posture       Range of Motion/Strength:     Knee Range of Motion: in degrees Left Right Pain/Dysfunction with Movement   Knee flexion  (135 norm) 140 degrees 120 degress Pn Right during flexion   Knee extension   (0 norm) 0 degrees 0 degrees      Knee MMT: 5/5 norm Left Right   Knee flexion 5/5 4+/5   Knee extension 5/5 4/5   Hip Flexion: L 4 / R 4  Hip Extension: L 4+ / R 4+  Hip IR: L 5 / R 5  Hip ER: L 5 / R 4  Hip Abd: L 4+ / R 4      Flexibility Left Right   Hamstrings @ 90/90   (-20 norm) -41 degrees -35 degrees     Quadriceps: (mild to severe restriction grading)  Left: TBD  Right: TBD    Girth measurements Left Right   5 cm above MJL  41.1 cm  41.4 cm    At MJL  38.5 cm  39 cm    5 cm below MJL 34.7 cm  35 cm      Other:    Track alignment with Squats: anterior translation or decreased knee flexion         Evaluation   Single Limb Stance R LE 4 seconds  (<10 sec = HIGH FALL RISK)   Single Limb Stance L LE 15 seconds  (<10 sec = HIGH FALL RISK)     LEFS: 7.8 intake    CMS Impairment/Limitation/Restriction for FOTO Survey    Therapist reviewed FOTO scores for Dennis Oden on 10/13/2022.   FOTO documents entered into [employer] - see Media section.    Limitation Score: 79%  Category: Other    Current : CL = least 60% but < 80% impaired, limited or restricted  Goal: CK = at least 40% but < 60%  impaired, limited or restricted  Discharge:          TREATMENT   Treatment Time In: 115  Treatment Time Out: 128  Total Treatment time separate from Evaluation: 13 minutes    Dennis received therapeutic exercises to develop strength, ROM, and flexibility for 13 minutes including:    TABLE:  Longsit Quad set, 30x, 5sec  Longsit Hamstring set, 30x, 5sec  Longsit AAROM heel slides, 20x, 5sec (active next)    STAND:  RIGHT Single Leg Balance, add airex, [id number]    SEATED:  Hamstring stretch, [id number] RIGHT         Add NEXT:  SLR  Sidelying clamshells  Gastroc stretch  Standing Red Theraband Hip 4-way  Seated HR/TR, 30x ea  Test Quad flexibility  Mini squat  Bike, Level 1, 5 minutes  Manual tx:   Patella mobs  Myofascial Release to muscles surrounding right patella  supine with LLE elevated, edema massage towards heart  FUTURE:  Mini Squats  March  RTB lateral steps  FSU  Lateral Step Down black strip stairs for alignment  Single Leg squats  Shuttle  Return to walk/jog program (must be pain free)     Home Exercises and Patient Education Provided    Education provided:   - Ankle pump with elevation, at home only, 5 minutes end of day  -daily HEP  -ice post flare-ups, 10-20 minutes    Written Home Exercises Provided: Patient instructed to cont prior HEP.  Exercises were reviewed and Dennis was able to demonstrate them prior to the end of the session.  Dennis demonstrated good  understanding of the education provided.     See EMR under Patient Instructions for exercises provided 10/13/2022.    Assessment   Dennis is a 47 y.o. male referred to outpatient Physical Therapy with a medical diagnosis of Chondromalacia of right patella. Pt presents with pain deep to patella, decreased and painful KF Range of Motion, Right Lower Extremity weakness, proprioception and gait deficits.  Pt prognosis is Good.   Pt will benefit from skilled outpatient Physical Therapy to address the deficits stated above and in the chart below,  provide pt/family education, and to maximize pt's level of independence.     Plan of care discussed with patient: Yes  Pt's spiritual, cultural and educational needs considered and patient is agreeable to the plan of care and goals as stated below:     Anticipated Barriers for therapy: chronicity of condition    Medical Necessity is demonstrated by the following  History  Co-morbidities and personal factors that may impact the plan of care Co-morbidities:   none    Personal Factors:   coping style  lifestyle     low   Examination  Body Structures and Functions, activity limitations and participation restrictions that may impact the plan of care Body Regions:   lower extremities    Body Systems:    gross symmetry  ROM  strength  balance  gait  motor control  motor learning    Participation Restrictions:   ADLs, chores, work, community    Activity limitations:   Learning and applying knowledge  no deficits    General Tasks and Commands  no deficits    Communication  communicating with/receiving spoken language    Mobility  lifting and carrying objects  walking  driving (bike, car, motorcycle)    Self care  dressing    Domestic Life  doing house work (cleaning house, washing dishes, laundry)  assisting others    Interactions/Relationships  no deficits    Life Areas  employment    Community and Social Life  community life  recreation and leisure         low   Clinical Presentation stable and uncomplicated low   Decision Making/ Complexity Score: low     Goals:  Short Term GOALS: 4 weeks  1. Patient is compliant with HEP.   2. Patient demonstrates Hamstring flexibility to -20 degrees in order to improve stance during gait.  3. Patient demonstrates inc KF ROM to 135 degrees in order to improve symmetrical motion during daily activities.  4. Patient demonstrates decreased girth by 1 cm in order to tolerate increased WB on RLE.    Long Term GOALS: 8 weeks.   1. Patient demonstrates increased hip and RLE strength by 1/2  grade or greater to improve tolerance to functional activities.   2. Patient demonstrates independence with HEP.  3. Patient will improve FOTO limitation score to </= 40% to show improvement in condition and functional mobility.     Plan   Plan of care Certification: 10/13/2022 to 1/13/2023.    Outpatient Physical Therapy 2 times weekly for 8 weeks to include the following interventions: Electrical Stimulation ,, Gait Training, Manual Therapy, Moist Heat/ Ice, Neuromuscular Re-ed, Orthotic Management and Training, Patient Education, Self Care, Therapeutic Activities, Therapeutic Exercise, and Ultrasound.     Helga Beltrán, PT

## 2022-10-14 NOTE — PLAN OF CARE
[facility]  Physical Therapy Initial Evaluation    Name: Dennis Oedn  Clinic Number: [id number]    Therapy Diagnosis:   Encounter Diagnoses   Name Primary?    Impaired proprioception     Weakness of right lower extremity      Physician: Janelle Taveras PA    Physician Orders: PT Eval and Treat   Medical Diagnosis from Referral: M22.41 (ICD-10-CM) - Chondromalacia of right patella  Evaluation Date: 10/13/2022  Authorization Period Expiration: 12/31/2022  Plan of Care Expiration: 1/13/2023 or 16v post eval  Visit # (total) / Visits authorized: 1 / eval (POC 0 / 16)  2nd FOTO visit 5  3rd FOTO visit 10  Progress Note Due: 11/13/2022    Time In: 1230  Time Out: 128  Total Billable Time: 58 minutes    Precautions: Mild back pain  Insurance: Payor: MEDICAID / Plan: [place] [employer] / Product Type: Managed Medicaid /     Subjective   Date of onset: 2 years ago; Meniscectomy 4/22/2022  History of current condition - [facility] reports: he had a surgery about 8 months ago. Was getting therapy at the [facility] location and then insurance ran out. Was not able to get back into that clinic for a few weeks so he came here. He is still having trouble with squatting, stairs, changing position, prolong standing and walking, and running. He wants to get better. Was discharged from surgeon and instructed to follow up if any future issues.       Medical History:   No past medical history on file.    Surgical History:   Dennis Oden  has no past surgical history on file.    Medications:   Dennis currently has no medications in their medication list.    Allergies:   Review of patient's allergies indicates:  No Known Allergies     Imaging, in EPIC    Prior Therapy: yes  Social History / Stairs: lives with family; 1-story  Occupation: [employer]; on feet all day  Cancer hx: no  Prior Level of Function: brace in the past  Current Level of Function: difficult/pain with work, change position,  squatting, getting up prolong positions, walk, bike    Pain:  Current 7/10, worst 8/10, best 5/10   Location: right underneath patella  Description: sharp; denies n/t   Aggravating Factors: difficult/pain with work, change position, squatting, getting up prolong positions, walk, bike  Easing Factors: bike, walk    Pts goals: to get better; return to martial arts and running    Objective     Posture Observation: Arches / GV: slight medial tilt; slight External Rotation; protrude abdominals; upper sway back posture  Palpation: NT      Gait Without AD   Analysis Mild antalgic; increased impact with Weight Bearing; flexed posture       Range of Motion/Strength:     Knee Range of Motion: in degrees Left Right Pain/Dysfunction with Movement   Knee flexion  (135 norm) 140 degrees 120 degress Pn Right during flexion   Knee extension   (0 norm) 0 degrees 0 degrees      Knee MMT: 5/5 norm Left Right   Knee flexion 5/5 4+/5   Knee extension 5/5 4/5   Hip Flexion: L 4 / R 4  Hip Extension: L 4+ / R 4+  Hip IR: L 5 / R 5  Hip ER: L 5 / R 4  Hip Abd: L 4+ / R 4      Flexibility Left Right   Hamstrings @ 90/90   (-20 norm) -41 degrees -35 degrees     Quadriceps: (mild to severe restriction grading)  Left: TBD  Right: TBD    Girth measurements Left Right   5 cm above MJL  41.1 cm  41.4 cm    At MJL  38.5 cm  39 cm    5 cm below MJL 34.7 cm  35 cm      Other:    Track alignment with Squats: anterior translation or decreased knee flexion         Evaluation   Single Limb Stance R LE 4 seconds  (<10 sec = HIGH FALL RISK)   Single Limb Stance L LE 15 seconds  (<10 sec = HIGH FALL RISK)     LEFS: 7.8 intake    CMS Impairment/Limitation/Restriction for FOTO Survey    Therapist reviewed FOTO scores for Dennis Oden on 10/13/2022.   FOTO documents entered into [employer] - see Media section.    Limitation Score: 79%  Category: Other    Current : CL = least 60% but < 80% impaired, limited or restricted  Goal: CK = at least 40% but < 60%  impaired, limited or restricted  Discharge:          TREATMENT   Treatment Time In: 115  Treatment Time Out: 128  Total Treatment time separate from Evaluation: 13 minutes    Dennis received therapeutic exercises to develop strength, ROM, and flexibility for 13 minutes including:    TABLE:  Longsit Quad set, 30x, 5sec  Longsit Hamstring set, 30x, 5sec  Longsit AAROM heel slides, 20x, 5sec (active next)    STAND:  RIGHT Single Leg Balance, add airex, [id number]    SEATED:  Hamstring stretch, [id number] RIGHT         Add NEXT:  SLR  Sidelying clamshells  Gastroc stretch  Standing Red Theraband Hip 4-way  Seated HR/TR, 30x ea  Test Quad flexibility  Mini squat  Bike, Level 1, 5 minutes  Manual tx:   Patella mobs  Myofascial Release to muscles surrounding right patella  supine with LLE elevated, edema massage towards heart  FUTURE:  Mini Squats  March  RTB lateral steps  FSU  Lateral Step Down black strip stairs for alignment  Single Leg squats  Shuttle  Return to walk/jog program (must be pain free)     Home Exercises and Patient Education Provided    Education provided:   - Ankle pump with elevation, at home only, 5 minutes end of day  -daily HEP  -ice post flare-ups, 10-20 minutes    Written Home Exercises Provided: Patient instructed to cont prior HEP.  Exercises were reviewed and Dennis was able to demonstrate them prior to the end of the session.  Dennis demonstrated good  understanding of the education provided.     See EMR under Patient Instructions for exercises provided 10/13/2022.    Assessment   Dennis is a 47 y.o. male referred to outpatient Physical Therapy with a medical diagnosis of Chondromalacia of right patella. Pt presents with pain deep to patella, decreased and painful KF Range of Motion, Right Lower Extremity weakness, proprioception and gait deficits.  Pt prognosis is Good.   Pt will benefit from skilled outpatient Physical Therapy to address the deficits stated above and in the chart below,  provide pt/family education, and to maximize pt's level of independence.     Plan of care discussed with patient: Yes  Pt's spiritual, cultural and educational needs considered and patient is agreeable to the plan of care and goals as stated below:     Anticipated Barriers for therapy: chronicity of condition    Medical Necessity is demonstrated by the following  History  Co-morbidities and personal factors that may impact the plan of care Co-morbidities:   none    Personal Factors:   coping style  lifestyle     low   Examination  Body Structures and Functions, activity limitations and participation restrictions that may impact the plan of care Body Regions:   lower extremities    Body Systems:    gross symmetry  ROM  strength  balance  gait  motor control  motor learning    Participation Restrictions:   ADLs, chores, work, community    Activity limitations:   Learning and applying knowledge  no deficits    General Tasks and Commands  no deficits    Communication  communicating with/receiving spoken language    Mobility  lifting and carrying objects  walking  driving (bike, car, motorcycle)    Self care  dressing    Domestic Life  doing house work (cleaning house, washing dishes, laundry)  assisting others    Interactions/Relationships  no deficits    Life Areas  employment    Community and Social Life  community life  recreation and leisure         low   Clinical Presentation stable and uncomplicated low   Decision Making/ Complexity Score: low     Goals:  Short Term GOALS: 4 weeks  1. Patient is compliant with HEP.   2. Patient demonstrates Hamstring flexibility to -20 degrees in order to improve stance during gait.  3. Patient demonstrates inc KF ROM to 135 degrees in order to improve symmetrical motion during daily activities.  4. Patient demonstrates decreased girth by 1 cm in order to tolerate increased WB on RLE.    Long Term GOALS: 8 weeks.   1. Patient demonstrates increased hip and RLE strength by 1/2  grade or greater to improve tolerance to functional activities.   2. Patient demonstrates independence with HEP.  3. Patient will improve FOTO limitation score to </= 40% to show improvement in condition and functional mobility.     Plan   Plan of care Certification: 10/13/2022 to 1/13/2023.    Outpatient Physical Therapy 2 times weekly for 8 weeks to include the following interventions: Electrical Stimulation ,, Gait Training, Manual Therapy, Moist Heat/ Ice, Neuromuscular Re-ed, Orthotic Management and Training, Patient Education, Self Care, Therapeutic Activities, Therapeutic Exercise, and Ultrasound.     Helga Beltrán, PT

## 2022-10-17 ENCOUNTER — CLINICAL SUPPORT (OUTPATIENT)
Dept: REHABILITATION | Facility: HOSPITAL | Age: 47
End: 2022-10-17
Payer: MEDICAID

## 2022-10-17 ENCOUNTER — DOCUMENTATION ONLY (OUTPATIENT)
Dept: REHABILITATION | Facility: HOSPITAL | Age: 47
End: 2022-10-17
Payer: MEDICAID

## 2022-10-17 DIAGNOSIS — R29.898 WEAKNESS OF RIGHT LOWER EXTREMITY: ICD-10-CM

## 2022-10-17 DIAGNOSIS — R29.818 IMPAIRED PROPRIOCEPTION: Primary | ICD-10-CM

## 2022-10-17 PROCEDURE — 97110 THERAPEUTIC EXERCISES: CPT | Mod: PN,CQ

## 2022-10-17 NOTE — PROGRESS NOTES
Dennis has been getting treated for his knee s/p surgery. He has been absent for a couple months due to insurance difficulties and was denied by insurance. He is being transferred to another facility and is appropriate for D/C from the [facility] at this time.

## 2022-10-17 NOTE — PROGRESS NOTES
[facility]   Physical Therapy Treatment Note     Name: Dennis Oden  Clinic Number: [id number]    Therapy Diagnosis:   Encounter Diagnoses   Name Primary?    Impaired proprioception Yes    Weakness of right lower extremity      Physician: Franchesca Richard NP    Visit Date: 10/17/2022  Physician Orders: PT Eval and Treat   Medical Diagnosis from Referral: M22.41 (ICD-10-CM) - Chondromalacia of right patella  Evaluation Date: 10/13/2022  Authorization Period Expiration: 12/31/2022  Plan of Care Expiration: 1/13/2023 or 16v post eval  Visit # (total) / Visits authorized: 2 / 6+eval (POC 1 / 16)  2nd FOTO visit 5  3rd FOTO visit 10  Progress Note Due: 11/13/2022       Precautions: Standard     Time In: 1004  Time Out: 1058  Total Billable Time: 54 minutes      SUBJECTIVE     Pt reports: pain with activity.  He was compliant with home exercise program.  Response to previous treatment: first visit after eval   Functional change: none today    Pain: 4/10  Location: right knee      OBJECTIVE     Objective Measures updated at progress report unless specified.     Treatment     Dennis received the treatments listed below:      therapeutic exercises to develop strength, ROM, and flexibility for 54 minutes (medicaid) including:    During 54 minutes of therapeutic exercise, Dennis, was supervised by a rehabilitation technician under the direction of the treating therapist.tech    TABLE:  Longsit Quad set, 30x, 5sec  Longsit Hamstring set, 30x, 5sec  Longsit AAROM heel slides, 20x, 5sec (active next)  Straight Leg Raise x 13 due to pain/fatigue  Sidelying clamshells Green Theraband x 20    STAND:  RIGHT Single Leg Balance, add airex, [id number]  Gastroc stretch 3 x 30 sec Bilateral   Standing Red Theraband Hip 4-way NOT PERFORMED time     Mini squat    SEATED:  Seated HR/TR, 30x ea  Hamstring stretch, [id number] RIGHT      Bike x 10 minutes, level 2     Add NEXT:  Test Quad flexibility  Manual tx:    Patella mobs  Myofascial Release to muscles surrounding right patella  supine with LLE elevated, edema massage towards heart  FUTURE:  Mini Squats  March  RTB lateral steps  FSU  Lateral Step Down black strip stairs for alignment  Single Leg squats  Shuttle  Return to walk/jog program (must be pain free)     Patient Education and Home Exercises     Home Exercises Provided and Patient Education Provided     Education provided:   - cont HEP    Written Home Exercises Provided: Patient instructed to cont prior HEP. Exercises were reviewed and Dennis was able to demonstrate them prior to the end of the session.  Dennis demonstrated good  understanding of the education provided. See EMR under Patient Instructions for exercises provided during therapy sessions    ASSESSMENT     Dennis presents with decreased R LE strength and Range of Motion.  He tolerated PRE's with minimal discomfort on some exercises.  Continued balance, strengthening to improve job duties, hobbies.      Dennis Is progressing well towards his goals.   Pt prognosis is Good.     Pt will continue to benefit from skilled outpatient physical therapy to address the deficits listed in the problem list box on initial evaluation, provide pt/family education and to maximize pt's level of independence in the home and community environment.     Pt's spiritual, cultural and educational needs considered and pt agreeable to plan of care and goals.     Anticipated barriers to physical therapy:  none      Goals:  Short Term GOALS: 4 weeks  ongoing 10/17/2022  1. Patient is compliant with HEP.   2. Patient demonstrates Hamstring flexibility to -20 degrees in order to improve stance during gait.  3. Patient demonstrates inc KF ROM to 135 degrees in order to improve symmetrical motion during daily activities.  4. Patient demonstrates decreased girth by 1 cm in order to tolerate increased WB on RLE.     Long Term GOALS: 8 weeks.  Ongoing  10/17/2022  1. Patient  demonstrates increased hip and RLE strength by 1/2 grade or greater to improve tolerance to functional activities.   2. Patient demonstrates independence with HEP.  3. Patient will improve FOTO limitation score to </= 40% to show improvement in condition and functional mobility.        PLAN     Cont per Plan of Care, knee Range of Motion, strength    Adri Batista, PTA

## 2022-10-18 ENCOUNTER — DOCUMENTATION ONLY (OUTPATIENT)
Dept: REHABILITATION | Facility: HOSPITAL | Age: 47
End: 2022-10-18
Payer: MEDICAID

## 2022-10-19 NOTE — PROGRESS NOTES
Outpatient Therapy Discharge Summary     Name: Dennis Oden  Clinic Number: [id number]    Therapy Diagnosis: No diagnosis found.  Physician: No ref. provider found    Physician Orders: eval and treat  Medical Diagnosis: Z98.890 (ICD-10-CM) - Status post arthroscopy of right knee  Evaluation Date: 5/18/22      Date of Last visit: 8/8/22  Total Visits Received: 16      Assessment    Patient was seen for 16 visits but is being discharge from PT due to not attending PT visits due to insurance coverage. He was able to achieve some of his short term goals but not his long term goals. Patient would like to be transferred and start PT at another clinic.     GOALS: Short Term Goals:  4-6 weeks  1.Report decreased knee pain  < / =  3/10  to increase tolerance for ambulation and work acitivities - MET  2. Increase knee ROM to full passive in order to be able to perform ADLs without difficulty. - MET  3. Increase strength by 1/3 MMT grade in quad  to increase tolerance for ADL and work activities. MET  4. 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  4. Pt will report at CJ level (20-40% impaired) on FOTO knee to demonstrate increase in LE function with every day tasks.     Discharge FOTO Score: NT    Discharge reason: Patient has not attended therapy since 8/8/22 due to insurance denial    Plan   This patient is discharged from Physical Therapy

## 2022-10-20 ENCOUNTER — CLINICAL SUPPORT (OUTPATIENT)
Dept: REHABILITATION | Facility: HOSPITAL | Age: 47
End: 2022-10-20
Payer: MEDICAID

## 2022-10-20 DIAGNOSIS — R29.898 WEAKNESS OF RIGHT LOWER EXTREMITY: ICD-10-CM

## 2022-10-20 DIAGNOSIS — R29.818 IMPAIRED PROPRIOCEPTION: Primary | ICD-10-CM

## 2022-10-20 PROCEDURE — 97110 THERAPEUTIC EXERCISES: CPT | Mod: PN

## 2022-10-20 NOTE — PROGRESS NOTES
[facility]   Physical Therapy Treatment Note     Name: Dennis Oden  Clinic Number: [id number]    Therapy Diagnosis:   Encounter Diagnoses   Name Primary?    Impaired proprioception Yes    Weakness of right lower extremity      Physician: [facility] Provider    Visit Date: 10/20/2022  Physician Orders: PT Eval and Treat   Medical Diagnosis from Referral: M22.41 (ICD-10-CM) - Chondromalacia of right patella  Evaluation Date: 10/13/2022  Authorization Period Expiration: 12/31/2022  Plan of Care Expiration: 1/13/2023 or 16v post eval  Visit # (total) / Visits authorized: 3 / 6+eval (POC 2 / 16)  2nd FOTO visit 5  3rd FOTO visit 10  Progress Note Due: 11/13/2022       Precautions: Standard     Time In: 1030  Time Out: 1125  Total Billable Time: 55 minutes      SUBJECTIVE     Pt reports: he is sore and feels like a razor at the bottom of his patella.    He was compliant with home exercise program.  Response to previous treatment: first visit after eval   Functional change: none today    Pain: 7/10  Location: right knee      OBJECTIVE     Objective Measures updated at progress report unless specified.     Treatment     Meniscectomy 4/22/2022      therapeutic exercises to develop strength, ROM, and flexibility for 54 minutes (medicaid) including:  ALL INTERVENTIONS BILLED AS THEREX PER MEDICAID GUIDELINES   During 45 minutes of therapeutic exercise, Dennis, was supervised by a rehabilitation technician under the direction of the treating therapist    TABLE:  Longsit RIGHT Quad set, 30x, 5sec  Longsit RIGHT Hamstring set, 30x, 5sec  RIGHT Supine heel slides, 20x, 5sec   RIGHT Straight Leg Raise, 2x10 Knee Extension before lift to decrease lag (add to HEP)  Sidelying clamshells Green Theraband x 20  Prone quad stretch with strap, [id number]    STAND:  RIGHT Single Leg Balance, add airex, [id number]  Gastroc stretch 3 x 30 sec Bilateral   Standing Red Theraband Hip Flexion  Red Theraband lateral  steps, hallway, 1 set (GREEN next) (add to HEP)  Red Theraband monster walks, hallway, 1 set (GREEN)     SEATED:  Hamstring stretch, [id number] RIGHT - NOT PERFORMED      Bike x 10 minutes, level 2 - NOT PERFORMED     Manual tx: patella glides; supine with LLE elevated, edema massage towards heart       FUTURE:  MINI Squats, VC bottom back  March  RTB lateral steps  FSU  Lateral Step Down black strip stairs for alignment  Single Leg squats  Shuttle  Return to walk/jog program (must be pain free)     Patient Education and Home Exercises     Home Exercises Provided and Patient Education Provided     Education provided:   - cont HEP    Written Home Exercises Provided: Patient instructed to cont prior HEP. Exercises were reviewed and Dennis was able to demonstrate them prior to the end of the session.  Dennis demonstrated good  understanding of the education provided. See EMR under Patient Instructions for exercises provided during therapy sessions    ASSESSMENT     Dennis presents with decreased R LE strength and Range of Motion.  Pt able to perform all therex with minimal pn provocation.  Continued balance, strengthening to improve job duties, hobbies.      Dennis Is progressing well towards his goals.   Pt prognosis is Good.     Pt will continue to benefit from skilled outpatient physical therapy to address the deficits listed in the problem list box on initial evaluation, provide pt/family education and to maximize pt's level of independence in the home and community environment.     Pt's spiritual, cultural and educational needs considered and pt agreeable to plan of care and goals.     Anticipated barriers to physical therapy:  none      Goals:  Short Term GOALS: 4 weeks  ongoing 10/20/2022  1. Patient is compliant with HEP.   2. Patient demonstrates Hamstring flexibility to -20 degrees in order to improve stance during gait.  3. Patient demonstrates inc KF ROM to 135 degrees in order to improve symmetrical  motion during daily activities.  4. Patient demonstrates decreased girth by 1 cm in order to tolerate increased WB on RLE.     Long Term GOALS: 8 weeks.  Ongoing  10/20/2022  1. Patient demonstrates increased hip and RLE strength by 1/2 grade or greater to improve tolerance to functional activities.   2. Patient demonstrates independence with HEP.  3. Patient will improve FOTO limitation score to </= 40% to show improvement in condition and functional mobility.        PLAN     Cont per Plan of Care, knee Range of Motion, strength    Helga Beltrán, PT

## 2022-10-24 ENCOUNTER — CLINICAL SUPPORT (OUTPATIENT)
Dept: REHABILITATION | Facility: HOSPITAL | Age: 47
End: 2022-10-24
Payer: MEDICAID

## 2022-10-24 DIAGNOSIS — R29.818 IMPAIRED PROPRIOCEPTION: Primary | ICD-10-CM

## 2022-10-24 DIAGNOSIS — R29.898 WEAKNESS OF RIGHT LOWER EXTREMITY: ICD-10-CM

## 2022-10-24 PROCEDURE — 97110 THERAPEUTIC EXERCISES: CPT | Mod: PN

## 2022-10-24 NOTE — PROGRESS NOTES
[facility]   Physical Therapy Treatment Note     Name: Dennis Oden  Clinic Number: [id number]    Therapy Diagnosis:   Encounter Diagnoses   Name Primary?    Impaired proprioception Yes    Weakness of right lower extremity      Physician: [employer] Provider    Visit Date: 10/24/2022  Physician Orders: PT Eval and Treat   Medical Diagnosis from Referral: M22.41 (ICD-10-CM) - Chondromalacia of right patella  Evaluation Date: 10/13/2022  Authorization Period Expiration: 12/31/2022  Plan of Care Expiration: 1/13/2023 or 16v post eval  Visit # (total) / Visits authorized: 4 / 6+eval (POC 3 / 16) only 6 visits post eval approved  2nd FOTO visit 5  3rd FOTO visit 10  Progress Note Due: 11/13/2022       Precautions: Standard     Time In: 1030  Time Out: 1125  Total Billable Time: 55 minutes      SUBJECTIVE     Pt reports: he is having a bad leg day.    He was compliant with home exercise program.  Response to previous treatment: first visit after eval   Functional change: none today    Pain: 7/10  Location: right knee; inferior to patella     OBJECTIVE     Objective Measures updated at progress report unless specified.     Treatment     Meniscectomy 4/22/2022      therapeutic exercises to develop strength, ROM, and flexibility for 54 minutes (medicaid) including:  ALL INTERVENTIONS BILLED AS THEREX PER MEDICAID GUIDELINES   During 45 minutes of therapeutic exercise, Dennis, was supervised by a rehabilitation technician under the direction of the treating therapist    TABLE:  Longsit hamstring stretch, [id number]  Longsit RIGHT Quad set, 30x, 5sec  RIGHT Supine heel slides, 20x, 5sec   RIGHT Straight Leg Raise, 2x10 Knee Extension before lift to decrease lag (hep)  Bridge, 20-30x (hep)  Sidelying clamshells Green Theraband x 20 (hep)  Prone quad stretch with strap, [id number]    US: 3.3 mHz / 50% duty / 0.9cm2 - 8 minutes to edema region inferior of patella    STAND:  Heel Raises/ Toe Raises, 30x  (hep)  RIGHT Single Leg Balance, add airex, [id number] (hep)  Gastroc stretch 3 x 30 sec Bilateral   +FSU, 20-30x (hep)  Standing Red Theraband Hip Flexion, 2x15 NOT PERFORMED   Green Theraband lateral steps, hallway, 1 set (hep)  Green Theraband monster walks, hallway, 1 set (hep)     Bike x 10 minutes, level 2 - NOT PERFORMED     Manual tx: NOT PERFORMED   patella glides; supine with LLE elevated, edema massage towards heart       FUTURE:  MINI Squats, VC bottom back  March  RTB lateral steps  Lateral Step Down black strip stairs for alignment  Single Leg squats  Shuttle  Return to walk/jog program (must be pain free)     Patient Education and Home Exercises     Home Exercises Provided and Patient Education Provided     Education provided:   - cont HEP  -sleeve brace    Written Home Exercises Provided: Patient instructed to cont prior HEP. Exercises were reviewed and Dennis was able to demonstrate them prior to the end of the session.  Dennis demonstrated good  understanding of the education provided. See EMR under Patient Instructions for exercises provided during therapy sessions    ASSESSMENT     Dennis presents with decreased R LE strength. Pt has returned to full KF Range of Motion but still painful.  Pt able to perform all therex with minimal pn provocation.  Continued balance, strengthening to improve job duties, hobbies.      Dennis Is progressing well towards his goals.   Pt prognosis is Good.     Pt will continue to benefit from skilled outpatient physical therapy to address the deficits listed in the problem list box on initial evaluation, provide pt/family education and to maximize pt's level of independence in the home and community environment.     Pt's spiritual, cultural and educational needs considered and pt agreeable to plan of care and goals.     Anticipated barriers to physical therapy:  none      Goals:  Short Term GOALS: 4 weeks    1. Patient is compliant with HEP. MET 10/24/2022  2.  Patient demonstrates Hamstring flexibility to -20 degrees in order to improve stance during gait. PROGRESS 10/24/2022   3. Patient demonstrates inc KF ROM to 135 degrees in order to improve symmetrical motion during daily activities. MET 10/24/2022  4. Patient demonstrates decreased girth by 1 cm in order to tolerate increased WB on RLE.PROGRESS 10/24/2022     Long Term GOALS: 8 weeks.  Ongoing  10/24/2022  1. Patient demonstrates increased hip and RLE strength by 1/2 grade or greater to improve tolerance to functional activities.   2. Patient demonstrates independence with HEP.  3. Patient will improve FOTO limitation score to </= 40% to show improvement in condition and functional mobility.        PLAN     Cont per Plan of Care, knee Range of Motion, strength    Helga Beltrán, PT

## 2022-10-27 ENCOUNTER — CLINICAL SUPPORT (OUTPATIENT)
Dept: REHABILITATION | Facility: HOSPITAL | Age: 47
End: 2022-10-27
Payer: MEDICAID

## 2022-10-27 DIAGNOSIS — R29.898 WEAKNESS OF RIGHT LOWER EXTREMITY: ICD-10-CM

## 2022-10-27 DIAGNOSIS — R29.818 IMPAIRED PROPRIOCEPTION: Primary | ICD-10-CM

## 2022-10-27 PROCEDURE — 97110 THERAPEUTIC EXERCISES: CPT | Mod: PN

## 2022-10-27 NOTE — PROGRESS NOTES
[facility]   Physical Therapy Treatment Note     Name: Dennis Oden  [facility] Number: [id number]    Therapy Diagnosis:   Encounter Diagnoses   Name Primary?    Impaired proprioception Yes    Weakness of right lower extremity      Physician: Bely[employer], Provider    Visit Date: 10/27/2022  Physician Orders: PT Eval and Treat   Medical Diagnosis from Referral: M22.41 (ICD-10-CM) - Chondromalacia of right patella  Evaluation Date: 10/13/2022  Authorization Period Expiration: 12/31/2022  Plan of Care Expiration: 1/13/2023 or 16v post eval  Visit # (total) / Visits authorized: 5 / 6+eval (POC 4 / 16) only 6 visits post eval approved  2nd FOTO visit 5  3rd FOTO visit 10  Progress Note Due: 11/13/2022       Precautions: Standard     Time In: 1030  Time Out: 1125  Total Billable Time: 55 minutes      SUBJECTIVE     Pt reports: he is feeling sore. Felt better after last session but then pain returned at work.     He was NOT compliant with home exercise program.  Response to previous treatment: positive   Functional change: none today    Pain: 3-4/10  Location: right knee; inferior to patella     OBJECTIVE     Objective Measures updated at progress report unless specified.     Treatment     Meniscectomy 4/22/2022      therapeutic exercises to develop strength, ROM, and flexibility for 55 minutes (medicaid) including:  ALL INTERVENTIONS BILLED AS THEREX PER MEDICAID GUIDELINES   During 45 minutes of therapeutic exercise, Dennis, was supervised by a rehabilitation technician under the direction of the treating therapist    TABLE:  Longsit RIGHT hamstring stretch, [id number]  Longsit RIGHT Quad set, 30x, 5sec  RIGHT Supine heel slides, 20x, 5sec   RIGHT Straight Leg Raise, 2x10 Knee Extension before lift to decrease lag (hep)  Bridge, 20-30x (hep)  Sidelying clamshells Green Theraband x 20, Bilateral  (hep)  Prone RIGHT quad stretch with strap, [id number]    US: 3.3 mHz / 50% duty / 0.9cm2 - 0 minutes to  edema region inferior of patella - NOT PERFORMED     STAND:  Heel Raises/ Toe Raises, 30x (hep)  RIGHT Single Leg Balance, add airex, [id number] (hep)  Gastroc stretch 3 x 30 sec Bilateral   FSU, 30x (hep)  Standing Red Theraband Hip Flexion, 2x15 NOT PERFORMED   Green Theraband lateral steps, hallway, 1 set (hep)  Green Theraband monster walks, hallway, 1 set (hep)     Bike x 10 minutes, level 2 - NOT PERFORMED     Manual tx: NOT PERFORMED   patella glides; supine with LLE elevated, edema massage towards heart       FUTURE:  MINI Squats, VC bottom back  March  RTB lateral steps  Lateral Step Down black strip stairs for alignment  Single Leg squats  Shuttle  Return to walk/jog program (must be pain free)     Patient Education and Home Exercises     Home Exercises Provided and Patient Education Provided     Education provided:   - cont HEP  -sleeve brace    Written Home Exercises Provided: Patient instructed to cont prior HEP. Exercises were reviewed and Dennis was able to demonstrate them prior to the end of the session.  Dennis demonstrated good  understanding of the education provided. See EMR under Patient Instructions for exercises provided during therapy sessions    ASSESSMENT     Dennis presents with right knee pain and decreased R LE strength. Pt has returned to full KF Range of Motion but still painful at times. Pt able to perform all therex with minimal pn provocation. Continued balance, strengthening to improve job duties, hobbies.      Dennis Is progressing well towards his goals.   Pt prognosis is Good.     Pt will continue to benefit from skilled outpatient physical therapy to address the deficits listed in the problem list box on initial evaluation, provide pt/family education and to maximize pt's level of independence in the home and community environment.     Pt's spiritual, cultural and educational needs considered and pt agreeable to plan of care and goals.     Anticipated barriers to physical  therapy:  none      Goals:  Short Term GOALS: 4 weeks    1. Patient is compliant with HEP. MET 10/24/2022  2. Patient demonstrates Hamstring flexibility to -20 degrees in order to improve stance during gait. PROGRESS 10/27/2022   3. Patient demonstrates inc KF ROM to 135 degrees in order to improve symmetrical motion during daily activities. MET 10/24/2022  4. Patient demonstrates decreased girth by 1 cm in order to tolerate increased WB on RLE.PROGRESS 10/27/2022     Long Term GOALS: 8 weeks.  Ongoing  10/27/2022  1. Patient demonstrates increased hip and RLE strength by 1/2 grade or greater to improve tolerance to functional activities.   2. Patient demonstrates independence with HEP.  3. Patient will improve FOTO limitation score to </= 40% to show improvement in condition and functional mobility.        PLAN     Cont per Plan of Care, knee Range of Motion, strength    Helga Beltrán, PT

## 2022-11-03 ENCOUNTER — CLINICAL SUPPORT (OUTPATIENT)
Dept: REHABILITATION | Facility: HOSPITAL | Age: 47
End: 2022-11-03
Payer: MEDICAID

## 2022-11-03 DIAGNOSIS — R29.898 WEAKNESS OF RIGHT LOWER EXTREMITY: ICD-10-CM

## 2022-11-03 DIAGNOSIS — R29.818 IMPAIRED PROPRIOCEPTION: Primary | ICD-10-CM

## 2022-11-03 PROCEDURE — 97110 THERAPEUTIC EXERCISES: CPT | Mod: PN

## 2022-11-03 NOTE — PROGRESS NOTES
[facility]   Physical Therapy Treatment Note     Name: Dennis Oden  [facility] Number: [id number]    Therapy Diagnosis:   Encounter Diagnoses   Name Primary?    Impaired proprioception Yes    Weakness of right lower extremity        Physician: [employer] Provider    Visit Date: 11/3/2022  Physician Orders: PT Eval and Treat   Medical Diagnosis from Referral: M22.41 (ICD-10-CM) - Chondromalacia of right patella  Evaluation Date: 10/13/2022  Authorization Period Expiration: 12/31/2022  Plan of Care Expiration: 1/13/2023 or 16v post eval  Visit # (total) / Visits authorized: 6 / 6+eval (POC 5 / 16) only 6 visits post eval approved  2nd FOTO visit 5  3rd FOTO visit 10  Progress Note Due: 11/13/2022       Precautions: Standard     Time In: 1030  Time Out: 1125  Total Billable Time: 55 minutes      SUBJECTIVE     Pt reports: he is feeling stronger but pain comes and goes.     He was NOT compliant with home exercise program.  Response to previous treatment: positive   Functional change: none today    Pain: 3-4/10  Location: right knee; inferior to patella     OBJECTIVE     Objective Measures updated at progress report unless specified.     Treatment     Meniscectomy 4/22/2022      therapeutic exercises to develop strength, ROM, and flexibility for 55 minutes (medicaid) including:  ALL INTERVENTIONS BILLED AS THEREX PER MEDICAID GUIDELINES   During 45 minutes of therapeutic exercise, Dennis, was supervised by a rehabilitation technician under the direction of the treating therapist    TABLE:  Longsit RIGHT hamstring stretch, [id number]  RIGHT Straight Leg Raise, 30x Knee Extension before lift to decrease lag (hep)  Bridge, 30x (hep)  +Single Leg Bridges, 10x Bilateral   Sidelying clamshells Green Theraband x 30, Bilateral  (hep)  Prone RIGHT quad stretch with strap, [id number]    US: 3.3 mHz / 50% duty / 0.9cm2 - 0 minutes to edema region inferior of patella - NOT PERFORMED     STAND:  Heel Raises/  Toe Raises, 30x (hep)  RIGHT Single Leg Balance, add airex, [id number] (hep)  Gastroc stretch 3 x 30 sec Bilateral   FSU, 30x, 2nd step (hep)  Standing Red Theraband Hip Flexion, 2x15   Green Theraband lateral steps, hallway, 2 set (hep)  Green Theraband monster walks, hallway, 2 set (hep)  MINI Squats, VC bottom back, 20x (hep)  Single Leg squats, 2x10, difficult     Bike x 10 minutes, level 2 - NOT PERFORMED     Manual tx: NOT PERFORMED   patella glides; supine with LLE elevated, edema massage towards heart       FUTURE:  March  Lateral Step Down black strip stairs for alignment  Shuttle  Return to walk/jog program (must be pain free)     Patient Education and Home Exercises     Home Exercises Provided and Patient Education Provided     Education provided:   - cont HEP  -sleeve brace    Written Home Exercises Provided: Patient instructed to cont prior HEP. Exercises were reviewed and Dennis was able to demonstrate them prior to the end of the session.  Dennis demonstrated good  understanding of the education provided. See EMR under Patient Instructions for exercises provided during therapy sessions    ASSESSMENT     Dennis presents with right knee pain and decreased R LE strength. Pt able to perform all therex with minimal pn provocation. Double leg squat achieved without pain but single leg pain is difficult and compensatory. Continued balance, strengthening to improve job duties, hobbies.      Dennis Is progressing well towards his goals.   Pt prognosis is Good.     Pt will continue to benefit from skilled outpatient physical therapy to address the deficits listed in the problem list box on initial evaluation, provide pt/family education and to maximize pt's level of independence in the home and community environment.     Pt's spiritual, cultural and educational needs considered and pt agreeable to plan of care and goals.     Anticipated barriers to physical therapy:  none      Goals:  Short Term GOALS: 4  weeks    1. Patient is compliant with HEP. MET 10/24/2022  2. Patient demonstrates Hamstring flexibility to -20 degrees in order to improve stance during gait. PROGRESS 11/3/2022   3. Patient demonstrates inc KF ROM to 135 degrees in order to improve symmetrical motion during daily activities. MET 10/24/2022  4. Patient demonstrates decreased girth by 1 cm in order to tolerate increased WB on RLE.PROGRESS 11/3/2022     Long Term GOALS: 8 weeks.  Ongoing  11/3/2022  1. Patient demonstrates increased hip and RLE strength by 1/2 grade or greater to improve tolerance to functional activities.   2. Patient demonstrates independence with HEP.  3. Patient will improve FOTO limitation score to </= 40% to show improvement in condition and functional mobility.        PLAN     Cont per Plan of Care, knee Range of Motion, strength    Helga Beltrán, PT

## 2022-11-10 ENCOUNTER — CLINICAL SUPPORT (OUTPATIENT)
Dept: REHABILITATION | Facility: HOSPITAL | Age: 47
End: 2022-11-10
Payer: MEDICAID

## 2022-11-10 DIAGNOSIS — R29.898 WEAKNESS OF RIGHT LOWER EXTREMITY: ICD-10-CM

## 2022-11-10 DIAGNOSIS — R29.818 IMPAIRED PROPRIOCEPTION: Primary | ICD-10-CM

## 2022-11-10 PROCEDURE — 97110 THERAPEUTIC EXERCISES: CPT | Mod: PN

## 2022-11-10 NOTE — PLAN OF CARE
[facility]   Physical Therapy Treatment Note & Updated Physical Therapy POC     Name: Dennis Oden  Clinic Number: [id number]    Therapy Diagnosis:   No diagnosis found.      Physician: [employer], Provider    Visit Date: 11/10/2022  Physician Orders: PT Eval and Treat   Medical Diagnosis from Referral: M22.41 (ICD-10-CM) - Chondromalacia of right patella  Evaluation Date: 10/13/2022  Authorization Period Expiration: 12/31/2022  Plan of Care Expiration: 1/13/2023 or 16v post eval  Visit # (total) / Visits authorized: 7 / 6+eval (POC 6 / 16)   2nd FOTO visit 5  3rd FOTO visit 10  Progress Note Due: 11/13/2022       Precautions: Standard     Time In: 1230  Time Out: 125  Total Billable Time: 55 minutes      SUBJECTIVE     Pt reports: he is feeling stronger but pain comes and goes. Pain comes on at work and makes it difficult to work. He would like to be able to run after his 5 year old at home.     He was compliant with home exercise program.  Response to previous treatment: positive   Functional change: none today    Pain: 2-8/10 range; 5/10 today  Location: right knee; inferior to patella     OBJECTIVE     Updated Measurements -> 11/10/2022    Range of Motion/Strength:      Knee Range of Motion: in degrees Left Right Pain/Dysfunction with Movement   Knee flexion  (135 norm) 140 degrees 120 degress->135 Pn Right during flexion   Knee extension   (0 norm) 0 degrees 0 degrees        Knee MMT: 5/5 norm Left Right   Knee flexion 5/5 4+/5->same   Knee extension 5/5 4/5->4+/5   Hip Flexion: L 4 / R 4  Hip Extension: L 4+ / R 4+  Hip IR: L 5 / R 5  Hip ER: L 5 / R 4->4+  Hip Abd: L 4+ / R 4->4+        Flexibility Left Right   Hamstrings @ 90/90   (-20 norm) -41 degrees -35 degrees->-30         Girth measurements Left Right   5 cm above MJL            41.1 cm       41.4 cm         At MJL     38.5 cm       39 cm->38.2         5 cm below MJL 34.7 cm       35 cm            Other:        Evaluation    Single Limb Stance R LE 4 seconds->15 sec  (<10 sec = HIGH FALL RISK)   Single Limb Stance L LE 15 seconds  (<10 sec = HIGH FALL RISK)      LEFS: 7.8 intake; 41.3 on 11/10/2022     CMS Impairment/Limitation/Restriction for FOTO Survey     Therapist reviewed FOTO scores for Dennis Oden on 11/10/2022  FOTO documents entered into [employer] - see Media section.     Limitation Score: 79%->46%  Category: Other     Current : CL = least 60% but < 80% impaired, limited or restricted  Goal: CK = at least 40% but < 60% impaired, limited or restricted  Discharge:             Treatment     Meniscectomy 4/22/2022      therapeutic exercises to develop strength, ROM, and flexibility for 55 minutes (medicaid) including:  ALL INTERVENTIONS BILLED AS THEREX PER MEDICAID GUIDELINES   During 30 minutes of therapeutic exercise, Dennis, was supervised by a rehabilitation technician under the direction of the treating therapist    TABLE:  Longsit RIGHT hamstring stretch, [id number]  RIGHT Straight Leg Raise, 30x Knee Extension before lift to decrease lag (hep)  Bridge, 30x (hep)  Single Leg Bridges, 10x Bilateral   Sidelying clamshells Green Theraband x 30, Bilateral  (hep)  Prone RIGHT quad stretch with strap, [id number]    US: 3.3 mHz / 50% duty / 0.9cm2 - 0 minutes to edema region inferior of patella - NOT PERFORMED     STAND:  Heel Raises/ Toe Raises, 30x (hep)  RIGHT Single Leg Balance plyotoss, 3-way,  green disc, 10x ea direction (hep) - difficult  Gastroc stretch 3 x 30 sec Bilateral   FSU, 2x10, 2nd step (hep)  Standing Red Theraband Hip Flexion, 2x15   Green Theraband lateral steps, hallway, 2 set (hep)  Green Theraband monster walks, hallway, 2 set (hep)  MINI Squats, VC bottom back, 20x (hep)  Single Leg squats, 2x10, difficult     Bike x 10 minutes, level 2 - NOT PERFORMED     Manual tx: NOT PERFORMED   patella glides; supine with LLE elevated, edema massage towards heart       FUTURE:  Lateral Step Down black strip stairs for  alignment  Shuttle  Return to walk/jog program (must be pain free)   Impact / Agility  Hallway shuffles  Fast step ups  Trampoline  A-P  M-L    Patient Education and Home Exercises     Home Exercises Provided and Patient Education Provided     Education provided:   - cont HEP  -sleeve brace    Written Home Exercises Provided: Patient instructed to cont prior HEP. Exercises were reviewed and Dennis was able to demonstrate them prior to the end of the session.  Dennis demonstrated good  understanding of the education provided. See EMR under Patient Instructions for exercises provided during therapy sessions    ASSESSMENT     Dennis presents with right knee pain and decreased R hip and LE strength. Pt has made some significant clinical gains in Range of Motion, strength and balance but has not met his maximum rehabilitation potential at this time. He stills has pain at work and has not began any impact, running or agility training yet. Continued Range of Motion, balance, strengthening, and proprioception training needed to improve tolerance to job and participating in family activities.      Dennis Is progressing well towards his goals.   Pt prognosis is Good.     Pt will continue to benefit from skilled outpatient physical therapy to address the deficits listed in the problem list box on initial evaluation, provide pt/family education and to maximize pt's level of independence in the home and community environment.     Pt's spiritual, cultural and educational needs considered and pt agreeable to plan of care and goals.     Anticipated barriers to physical therapy:  none      Goals:  Short Term GOALS: 4 weeks    1. Patient is compliant with HEP. MET 10/24/2022  2. Patient demonstrates Hamstring flexibility to -20 degrees in order to improve stance during gait. PROGRESS 11/10/2022   3. Patient demonstrates inc KF ROM to 135 degrees in order to improve symmetrical motion during daily activities. MET  10/24/2022  4. Patient demonstrates decreased girth by 1 cm in order to tolerate increased WB on RLE. MET 11/10/2022     Long Term GOALS: 8 weeks.  Ongoing  11/10/2022  1. Patient demonstrates increased hip and RLE strength by 1/2 grade or greater to improve tolerance to functional activities. PROGRESS, some met 11/10/2022  2. Patient demonstrates independence with HEP. MET 11/10/2022  3. Patient will improve FOTO limitation score to </= 41% to show improvement in condition and functional mobility. PROGRESS 11/10/2022    8 weeks:  Continue to progress and meet previous goals       PLAN     Cont per Plan of Care, knee Range of Motion, strength    Plan of care Certification: 10/13/2022 to 1/13/2023.     Outpatient Physical Therapy 2 times weekly for 8 weeks to include the following interventions: Electrical Stimulation ,, Gait Training, Manual Therapy, Moist Heat/ Ice, Neuromuscular Re-ed, Orthotic Management and Training, Patient Education, Self Care, Therapeutic Activities, Therapeutic Exercise, and Ultrasound.     Helga Beltrán, PT

## 2022-11-10 NOTE — PROGRESS NOTES
[facility]   Physical Therapy Treatment Note & Updated Physical Therapy POC     Name: Dennis Oden  Clinic Number: [id number]    Therapy Diagnosis:   No diagnosis found.      Physician: Belkys, Provider    Visit Date: 11/10/2022  Physician Orders: PT Eval and Treat   Medical Diagnosis from Referral: M22.41 (ICD-10-CM) - Chondromalacia of right patella  Evaluation Date: 10/13/2022  Authorization Period Expiration: 12/31/2022  Plan of Care Expiration: 1/13/2023 or 16v post eval  Visit # (total) / Visits authorized: 7 / 6+eval (POC 6 / 16)   2nd FOTO visit 5  3rd FOTO visit 10  Progress Note Due: 11/13/2022       Precautions: Standard     Time In: 1230  Time Out: 125  Total Billable Time: 55 minutes      SUBJECTIVE     Pt reports: he is feeling stronger but pain comes and goes. Pain comes on at work and makes it difficult to work. He would like to be able to run after his 5 year old at home.     He was compliant with home exercise program.  Response to previous treatment: positive   Functional change: none today    Pain: 2-8/10 range; 5/10 today  Location: right knee; inferior to patella     OBJECTIVE     Updated Measurements -> 11/10/2022    Range of Motion/Strength:      Knee Range of Motion: in degrees Left Right Pain/Dysfunction with Movement   Knee flexion  (135 norm) 140 degrees 120 degress->135 Pn Right during flexion   Knee extension   (0 norm) 0 degrees 0 degrees        Knee MMT: 5/5 norm Left Right   Knee flexion 5/5 4+/5->same   Knee extension 5/5 4/5->4+/5   Hip Flexion: L 4 / R 4  Hip Extension: L 4+ / R 4+  Hip IR: L 5 / R 5  Hip ER: L 5 / R 4->4+  Hip Abd: L 4+ / R 4->4+        Flexibility Left Right   Hamstrings @ 90/90   (-20 norm) -41 degrees -35 degrees->-30         Girth measurements Left Right   5 cm above MJL            41.1 cm       41.4 cm         At MJL     38.5 cm       39 cm->38.2         5 cm below MJL 34.7 cm       35 cm            Other:        Evaluation    Single Limb Stance R LE 4 seconds->15 sec  (<10 sec = HIGH FALL RISK)   Single Limb Stance L LE 15 seconds  (<10 sec = HIGH FALL RISK)      LEFS: 7.8 intake; 41.3 on 11/10/2022     CMS Impairment/Limitation/Restriction for FOTO Survey     Therapist reviewed FOTO scores for Dennis Oden on 11/10/2022  FOTO documents entered into [employer] - see Media section.     Limitation Score: 79%->46%  Category: Other     Current : CL = least 60% but < 80% impaired, limited or restricted  Goal: CK = at least 40% but < 60% impaired, limited or restricted  Discharge:             Treatment     Meniscectomy 4/22/2022      therapeutic exercises to develop strength, ROM, and flexibility for 55 minutes (medicaid) including:  ALL INTERVENTIONS BILLED AS THEREX PER MEDICAID GUIDELINES   During 30 minutes of therapeutic exercise, Dennis, was supervised by a rehabilitation technician under the direction of the treating therapist    TABLE:  Longsit RIGHT hamstring stretch, [id number]  RIGHT Straight Leg Raise, 30x Knee Extension before lift to decrease lag (hep)  Bridge, 30x (hep)  Single Leg Bridges, 10x Bilateral   Sidelying clamshells Green Theraband x 30, Bilateral  (hep)  Prone RIGHT quad stretch with strap, [id number]    US: 3.3 mHz / 50% duty / 0.9cm2 - 0 minutes to edema region inferior of patella - NOT PERFORMED     STAND:  Heel Raises/ Toe Raises, 30x (hep)  RIGHT Single Leg Balance plyotoss, 3-way,  green disc, 10x ea direction (hep) - difficult  Gastroc stretch 3 x 30 sec Bilateral   FSU, 2x10, 2nd step (hep)  Standing Red Theraband Hip Flexion, 2x15   Green Theraband lateral steps, hallway, 2 set (hep)  Green Theraband monster walks, hallway, 2 set (hep)  MINI Squats, VC bottom back, 20x (hep)  Single Leg squats, 2x10, difficult     Bike x 10 minutes, level 2 - NOT PERFORMED     Manual tx: NOT PERFORMED   patella glides; supine with LLE elevated, edema massage towards heart       FUTURE:  Lateral Step Down black strip stairs for  alignment  Shuttle  Return to walk/jog program (must be pain free)   Impact / Agility  Hallway shuffles  Fast step ups  Trampoline  A-P  M-L    Patient Education and Home Exercises     Home Exercises Provided and Patient Education Provided     Education provided:   - cont HEP  -sleeve brace    Written Home Exercises Provided: Patient instructed to cont prior HEP. Exercises were reviewed and Dennis was able to demonstrate them prior to the end of the session.  Dennis demonstrated good  understanding of the education provided. See EMR under Patient Instructions for exercises provided during therapy sessions    ASSESSMENT     Dennis presents with right knee pain and decreased R hip and LE strength. Pt has made some significant clinical gains in Range of Motion, strength and balance but has not met his maximum rehabilitation potential at this time. He stills has pain at work and has not began any impact, running or agility training yet. Continued Range of Motion, balance, strengthening, and proprioception training needed to improve tolerance to job and participating in family activities.      Dennis Is progressing well towards his goals.   Pt prognosis is Good.     Pt will continue to benefit from skilled outpatient physical therapy to address the deficits listed in the problem list box on initial evaluation, provide pt/family education and to maximize pt's level of independence in the home and community environment.     Pt's spiritual, cultural and educational needs considered and pt agreeable to plan of care and goals.     Anticipated barriers to physical therapy:  none      Goals:  Short Term GOALS: 4 weeks    1. Patient is compliant with HEP. MET 10/24/2022  2. Patient demonstrates Hamstring flexibility to -20 degrees in order to improve stance during gait. PROGRESS 11/10/2022   3. Patient demonstrates inc KF ROM to 135 degrees in order to improve symmetrical motion during daily activities. MET  10/24/2022  4. Patient demonstrates decreased girth by 1 cm in order to tolerate increased WB on RLE. MET 11/10/2022     Long Term GOALS: 8 weeks.  Ongoing  11/10/2022  1. Patient demonstrates increased hip and RLE strength by 1/2 grade or greater to improve tolerance to functional activities. PROGRESS, some met 11/10/2022  2. Patient demonstrates independence with HEP. MET 11/10/2022  3. Patient will improve FOTO limitation score to </= 41% to show improvement in condition and functional mobility. PROGRESS 11/10/2022    8 weeks:  Continue to progress and meet previous goals       PLAN     Cont per Plan of Care, knee Range of Motion, strength    Plan of care Certification: 10/13/2022 to 1/13/2023.     Outpatient Physical Therapy 2 times weekly for 8 weeks to include the following interventions: Electrical Stimulation ,, Gait Training, Manual Therapy, Moist Heat/ Ice, Neuromuscular Re-ed, Orthotic Management and Training, Patient Education, Self Care, Therapeutic Activities, Therapeutic Exercise, and Ultrasound.     Helga Beltrán, PT

## 2023-02-15 PROBLEM — R29.818 IMPAIRED PROPRIOCEPTION: Status: RESOLVED | Noted: 2022-10-13 | Resolved: 2023-02-15

## 2023-02-15 PROBLEM — R29.898 WEAKNESS OF RIGHT LOWER EXTREMITY: Status: RESOLVED | Noted: 2022-10-13 | Resolved: 2023-02-15

## 2023-02-15 NOTE — PLAN OF CARE
[facility] OUTPATIENT THERAPY AND WELLNESS   Discharge Note    Name: Dennis Oden  Clinic Number: [id number]    Therapy Diagnosis:   Encounter Diagnoses   Name Primary?    Impaired proprioception Yes    Weakness of right lower extremity      Physician: Belkys, Provider    Physician Orders: PT eval and treat  Medical Diagnosis: Chondromalacia of right patella  Evaluation Date: 10/13/2022    Date of Last visit: 11/10/2022  Total Visits Received: 7 / 17    ASSESSMENT      Dennis presents with right knee pain and decreased R hip and LE strength. Pt has made some significant clinical gains in Range of Motion, strength and balance but has not met his maximum rehabilitation potential at this time. He stills has pain at work and has not began any impact, running or agility training yet. Continued Range of Motion, balance, strengthening, and proprioception training needed to improve tolerance to job and participating in family activities.    Discharge reason: Patient has completed allowable visits authorized by insurance    Discharge FOTO Score: N/a    Goals:   Short Term GOALS: 4 weeks    1. Patient is compliant with HEP. MET 10/24/2022  2. Patient demonstrates Hamstring flexibility to -20 degrees in order to improve stance during gait. PROGRESS 11/10/2022   3. Patient demonstrates inc KF ROM to 135 degrees in order to improve symmetrical motion during daily activities. MET 10/24/2022  4. Patient demonstrates decreased girth by 1 cm in order to tolerate increased WB on RLE. MET 11/10/2022     Long Term GOALS: 8 weeks.  Ongoing  11/10/2022  1. Patient demonstrates increased hip and RLE strength by 1/2 grade or greater to improve tolerance to functional activities. PROGRESS, some met 11/10/2022  2. Patient demonstrates independence with HEP. MET 11/10/2022  3. Patient will improve FOTO limitation score to </= 41% to show improvement in condition and functional mobility. PROGRESS 11/10/2022     8 weeks:  Continue to  progress and meet previous goals PROGRESS    PLAN   This patient is discharged from Physical Therapy      Helga Beltrán, PT

## 2023-11-28 DIAGNOSIS — R10.30 LOWER ABDOMINAL PAIN: Primary | ICD-10-CM

## 2024-07-23 DIAGNOSIS — N20.0 URIC ACID NEPHROLITHIASIS: Primary | ICD-10-CM

## 2024-07-26 ENCOUNTER — HOSPITAL ENCOUNTER (OUTPATIENT)
Dept: RADIOLOGY | Facility: HOSPITAL | Age: 49
Discharge: HOME | End: 2024-07-26
Attending: NURSE PRACTITIONER
Payer: MEDICAID

## 2024-07-26 DIAGNOSIS — N20.0 URIC ACID NEPHROLITHIASIS: ICD-10-CM

## 2024-07-26 PROCEDURE — 76770 US EXAM ABDO BACK WALL COMP: CPT | Mod: TC,PO

## 2024-07-26 PROCEDURE — 76770 US EXAM ABDO BACK WALL COMP: CPT | Mod: 26,,, | Performed by: RADIOLOGY

## 2024-10-02 ENCOUNTER — HOSPITAL ENCOUNTER (EMERGENCY)
Facility: HOSPITAL | Age: 49
Discharge: HOME | End: 2024-10-02
Attending: EMERGENCY MEDICINE
Payer: MEDICAID

## 2024-10-02 VITALS
BODY MASS INDEX: 27.09 KG/M2 | SYSTOLIC BLOOD PRESSURE: 120 MMHG | WEIGHT: 200 LBS | OXYGEN SATURATION: 99 % | HEIGHT: 72 IN | HEART RATE: 66 BPM | TEMPERATURE: 98 F | DIASTOLIC BLOOD PRESSURE: 80 MMHG | RESPIRATION RATE: 18 BRPM

## 2024-10-02 DIAGNOSIS — N39.0 URINARY TRACT INFECTION WITH HEMATURIA, SITE UNSPECIFIED: Primary | ICD-10-CM

## 2024-10-02 DIAGNOSIS — R31.9 HEMATURIA, UNSPECIFIED TYPE: ICD-10-CM

## 2024-10-02 DIAGNOSIS — R31.9 URINARY TRACT INFECTION WITH HEMATURIA, SITE UNSPECIFIED: Primary | ICD-10-CM

## 2024-10-02 LAB
ALBUMIN SERPL BCP-MCNC: 4.3 G/DL (ref 3.5–5.2)
ALP SERPL-CCNC: 74 U/L (ref 55–135)
ALT SERPL W/O P-5'-P-CCNC: 30 U/L (ref 10–44)
ANION GAP SERPL CALC-SCNC: 5 MMOL/L (ref 8–16)
AST SERPL-CCNC: 15 U/L (ref 10–40)
BACTERIA #/AREA URNS HPF: NORMAL /HPF
BASOPHILS # BLD AUTO: 0.04 K/UL (ref 0–0.2)
BASOPHILS NFR BLD: 0.6 % (ref 0–1.9)
BILIRUB SERPL-MCNC: 0.5 MG/DL (ref 0.1–1)
BILIRUB UR QL STRIP: ABNORMAL
BUN SERPL-MCNC: 14 MG/DL (ref 6–20)
CALCIUM SERPL-MCNC: 9.3 MG/DL (ref 8.7–10.5)
CHLORIDE SERPL-SCNC: 107 MMOL/L (ref 95–110)
CLARITY UR: CLEAR
CO2 SERPL-SCNC: 25 MMOL/L (ref 23–29)
COLOR UR: YELLOW
CREAT SERPL-MCNC: 0.9 MG/DL (ref 0.5–1.4)
DIFFERENTIAL METHOD BLD: ABNORMAL
EOSINOPHIL # BLD AUTO: 0.2 K/UL (ref 0–0.5)
EOSINOPHIL NFR BLD: 2.7 % (ref 0–8)
ERYTHROCYTE [DISTWIDTH] IN BLOOD BY AUTOMATED COUNT: 12.5 % (ref 11.5–14.5)
EST. GFR  (NO RACE VARIABLE): >60 ML/MIN/1.73 M^2
GLUCOSE SERPL-MCNC: 113 MG/DL (ref 70–110)
GLUCOSE UR QL STRIP: NEGATIVE
HCT VFR BLD AUTO: 39.8 % (ref 40–54)
HGB BLD-MCNC: 14 G/DL (ref 14–18)
HGB UR QL STRIP: NEGATIVE
IMM GRANULOCYTES # BLD AUTO: 0.01 K/UL (ref 0–0.04)
IMM GRANULOCYTES NFR BLD AUTO: 0.1 % (ref 0–0.5)
KETONES UR QL STRIP: NEGATIVE
LEUKOCYTE ESTERASE UR QL STRIP: NEGATIVE
LYMPHOCYTES # BLD AUTO: 2.3 K/UL (ref 1–4.8)
LYMPHOCYTES NFR BLD: 33.3 % (ref 18–48)
MCH RBC QN AUTO: 31.4 PG (ref 27–31)
MCHC RBC AUTO-ENTMCNC: 35.2 G/DL (ref 32–36)
MCV RBC AUTO: 89 FL (ref 82–98)
MICROSCOPIC COMMENT: NORMAL
MONOCYTES # BLD AUTO: 0.7 K/UL (ref 0.3–1)
MONOCYTES NFR BLD: 10.4 % (ref 4–15)
NEUTROPHILS # BLD AUTO: 3.6 K/UL (ref 1.8–7.7)
NEUTROPHILS NFR BLD: 52.9 % (ref 38–73)
NITRITE UR QL STRIP: POSITIVE
NRBC BLD-RTO: 0 /100 WBC
PH UR STRIP: 6 [PH] (ref 5–8)
PLATELET # BLD AUTO: 212 K/UL (ref 150–450)
PMV BLD AUTO: 10.2 FL (ref 9.2–12.9)
POTASSIUM SERPL-SCNC: 4 MMOL/L (ref 3.5–5.1)
PROT SERPL-MCNC: 7.1 G/DL (ref 6–8.4)
PROT UR QL STRIP: NEGATIVE
RBC # BLD AUTO: 4.46 M/UL (ref 4.6–6.2)
RBC #/AREA URNS HPF: 1 /HPF (ref 0–4)
SODIUM SERPL-SCNC: 137 MMOL/L (ref 136–145)
SP GR UR STRIP: 1.01 (ref 1–1.03)
SQUAMOUS #/AREA URNS HPF: 0 /HPF
URN SPEC COLLECT METH UR: ABNORMAL
UROBILINOGEN UR STRIP-ACNC: ABNORMAL EU/DL
WBC # BLD AUTO: 6.75 K/UL (ref 3.9–12.7)
WBC #/AREA URNS HPF: 0 /HPF (ref 0–5)

## 2024-10-02 PROCEDURE — 99284 EMERGENCY DEPT VISIT MOD MDM: CPT | Mod: 25

## 2024-10-02 PROCEDURE — 80053 COMPREHEN METABOLIC PANEL: CPT | Performed by: EMERGENCY MEDICINE

## 2024-10-02 PROCEDURE — 85025 COMPLETE CBC W/AUTO DIFF WBC: CPT | Performed by: EMERGENCY MEDICINE

## 2024-10-02 PROCEDURE — 81001 URINALYSIS AUTO W/SCOPE: CPT | Performed by: EMERGENCY MEDICINE

## 2024-10-02 PROCEDURE — 87591 N.GONORRHOEAE DNA AMP PROB: CPT | Performed by: EMERGENCY MEDICINE

## 2024-10-02 PROCEDURE — 87491 CHLMYD TRACH DNA AMP PROBE: CPT | Performed by: EMERGENCY MEDICINE

## 2024-10-02 RX ORDER — CEFUROXIME AXETIL 500 MG/1
500 TABLET ORAL EVERY 12 HOURS
Qty: 14 TABLET | Refills: 0 | Status: SHIPPED | OUTPATIENT
Start: 2024-10-02 | End: 2024-10-09

## 2024-10-02 NOTE — ED PROVIDER NOTES
Encounter Date: 10/2/2024       History     Chief Complaint   Patient presents with    Hematuria    Dysuria     48-year-old male with a past medical history of nephrolithiasis presents emergency department with complaint of hematuria.  Patient states that he has had these symptoms previously when he was diagnosed with a UTI as well.  Patient reports that he saw a outside urologist last visit was several months ago.  The patient denies any associated fevers nausea or vomiting.  Denies any associated abdominal pain or flank pain.    The history is provided by the patient.     Review of patient's allergies indicates:  No Known Allergies  History reviewed. No pertinent past medical history.  History reviewed. No pertinent surgical history.  No family history on file.  Social History     Tobacco Use    Smoking status: Never    Smokeless tobacco: Never     Review of Systems   Constitutional:  Negative for chills and fever.   HENT: Negative.     Respiratory: Negative.     Cardiovascular: Negative.    Genitourinary:  Positive for hematuria. Negative for flank pain, frequency, penile discharge, penile pain, penile swelling, scrotal swelling, testicular pain and urgency.   Neurological: Negative.    Hematological: Negative.    Psychiatric/Behavioral: Negative.     All other systems reviewed and are negative.      Physical Exam     Initial Vitals [10/02/24 1018]   BP Pulse Resp Temp SpO2   129/86 66 17 97.7 °F (36.5 °C) 98 %      MAP       --         Physical Exam    Nursing note and vitals reviewed.  Constitutional: He appears well-developed and well-nourished.   HENT:   Head: Normocephalic and atraumatic.   Eyes: EOM are normal. Pupils are equal, round, and reactive to light.   Cardiovascular:  Normal rate, regular rhythm, normal heart sounds and intact distal pulses.           Pulmonary/Chest: Breath sounds normal.   Abdominal: Abdomen is soft. Bowel sounds are normal. He exhibits no distension. There is no abdominal  tenderness.   Musculoskeletal:         General: Normal range of motion.     Neurological: He is alert and oriented to person, place, and time. He has normal strength. GCS score is 15. GCS eye subscore is 4. GCS verbal subscore is 5. GCS motor subscore is 6.   Skin: Skin is warm.   Psychiatric: He has a normal mood and affect.         ED Course   Procedures  Labs Reviewed   URINALYSIS, REFLEX TO URINE CULTURE - Abnormal       Result Value    Specimen UA Urine, Clean Catch      Color, UA Yellow      Appearance, UA Clear      pH, UA 6.0      Specific Gravity, UA 1.010      Protein, UA Negative      Glucose, UA Negative      Ketones, UA Negative      Bilirubin (UA) 1+ (*)     Occult Blood UA Negative      Nitrite, UA Positive (*)     Urobilinogen, UA 2.0-3.0 (*)     Leukocytes, UA Negative      Narrative:     Specimen Source->Urine   CBC W/ AUTO DIFFERENTIAL - Abnormal    WBC 6.75      RBC 4.46 (*)     Hemoglobin 14.0      Hematocrit 39.8 (*)     MCV 89      MCH 31.4 (*)     MCHC 35.2      RDW 12.5      Platelets 212      MPV 10.2      Immature Granulocytes 0.1      Gran # (ANC) 3.6      Immature Grans (Abs) 0.01      Lymph # 2.3      Mono # 0.7      Eos # 0.2      Baso # 0.04      nRBC 0      Gran % 52.9      Lymph % 33.3      Mono % 10.4      Eosinophil % 2.7      Basophil % 0.6      Differential Method Automated     COMPREHENSIVE METABOLIC PANEL - Abnormal    Sodium 137      Potassium 4.0      Chloride 107      CO2 25      Glucose 113 (*)     BUN 14      Creatinine 0.9      Calcium 9.3      Total Protein 7.1      Albumin 4.3      Total Bilirubin 0.5      Alkaline Phosphatase 74      AST 15      ALT 30      eGFR >60.0      Anion Gap 5 (*)    URINALYSIS MICROSCOPIC    RBC, UA 1      WBC, UA 0      Bacteria Rare      Squam Epithel, UA 0      Microscopic Comment SEE COMMENT      Narrative:     Specimen Source->Urine   C. TRACHOMATIS/N. GONORRHOEAE BY AMP DNA   C. TRACHOMATIS/N. GONORRHOEAE BY AMP DNA          Imaging  Results              CT Renal Stone Study ABD Pelvis WO (Final result)  Result time 10/02/24 11:43:14      Final result by Markus Man MD (10/02/24 11:43:14)                   Impression:      1. Negative for hydronephrosis, urolithiasis, or other acute intra-abdominal abnormality.  2. Diverticulosis.  3. Evidence of mild intestinal stasis as above.  4. Small fat containing periumbilical hernia.      Electronically signed by: Markus Man  Date:    10/02/2024  Time:    11:43               Narrative:      EXAMINATION:    CT RENAL STONE STUDY ABD PELVIS WO    CLINICAL HISTORY:  Flank pain, kidney stone suspected;hematuria;    TECHNIQUE:  CT abdomen and pelvis without IV or oral contrast. Study is tailored for detection of urinary tract calculi and evaluation of solid organs, hollow viscera, and vascular structures is limited.    COMPARISON:  07/26/2024 ultrasound    FINDINGS:  CT ABDOMEN:    Visualized lung bases are clear.    Liver, gallbladder, pancreas, spleen, and adrenals show normal noncontrast appearance.  Noncontrast kidneys are normal, without hydronephrosis or urolithiasis.  Aorta is normal.    Colonic diverticula are evident.  Small bowel feces sign involving segment of distal ileum in the right lower quadrant suggest stasis, with intestines otherwise unremarkable.  A normal appendix is present.  No free intraperitoneal gas.  Small fat containing periumbilical hernia is present.    No acute osseous abnormality.    CT PELVIS:    Bladder is normal.  No free pelvic fluid.  Osseous structures are unremarkable.                                       Medications - No data to display  Medical Decision Making  48-year-old male with a past medical history of nephrolithiasis presents emergency department with complaint of hematuria.  Patient states that he has had these symptoms previously when he was diagnosed with a UTI as well.  Patient reports that he saw a outside urologist last visit was several months ago.   The patient denies any associated fevers nausea or vomiting.  Denies any associated abdominal pain or flank pain.    The history is provided by the patient.     Considerations include but not limited to, UTI, ureterolithiasis, STD exposure,     48-year-old male presents emergency department with dysuria, and noted to have hematuria this morning.  Patient has had previous nephrolithiasis.  He denies any associated nausea or vomiting.  The patient currently appears comfortable.  He states that he did follow up with the urologist he does not know the name of the doctor that he saw he states that he was sent for an ultrasound of his kidneys and told that they were fine and that he had an infection.  Patient states he took an unknown antibiotic he states that this was several months ago.  The patient denies any urethral discharge any testicular pain or swelling.  Patient's labs reveal no leukocytosis.  Patient's urine is nitrite positive suggestive of UTI.  He will be placed on antibiotics can given return precautions.  Patient will be referred to Urology    Amount and/or Complexity of Data Reviewed  Labs: ordered.  Radiology: ordered.    Risk  Prescription drug management.                                      Clinical Impression:  Final diagnoses:  [N39.0, R31.9] Urinary tract infection with hematuria, site unspecified (Primary)  [R31.9] Hematuria, unspecified type          ED Disposition Condition    Discharge Stable          ED Prescriptions       Medication Sig Dispense Start Date End Date Auth. Provider    cefUROXime (CEFTIN) 500 MG tablet Take 1 tablet (500 mg total) by mouth every 12 (twelve) hours. for 7 days 14 tablet 10/2/2024 10/9/2024 Beth Angel FNP          Follow-up Information       Follow up With Specialties Details Why Contact Info    Mikaela Still Jr., MD Urology Schedule an appointment as soon as possible for a visit in 2 days  [address] [place] [address] [address]  [place]  [id number]  [phone]               Beth Angel, [facility]  10/02/24 1606

## 2024-10-02 NOTE — DISCHARGE INSTRUCTIONS
Ceftin as directed until all gone   Please follow up with the urologist as directed for further evaluation, definitive care   Return if condition becomes worse for any concerns

## 2024-10-03 ENCOUNTER — TELEPHONE (OUTPATIENT)
Dept: UROLOGY | Facility: CLINIC | Age: 49
End: 2024-10-03
Payer: MEDICAID

## 2024-10-03 ENCOUNTER — TELEPHONE (OUTPATIENT)
Dept: NEUROLOGY | Facility: CLINIC | Age: 49
End: 2024-10-03
Payer: MEDICAID

## 2024-10-03 NOTE — TELEPHONE ENCOUNTER
----- Message from Shukri sent at 10/3/2024  1:48 PM CDT -----  Regarding: Urgent Scheduling Request  Contact: [phone]  Urgent Scheduling Request           Appt Type:  Np      Date/Time Preference: Today if possible      Caller Name: Pt      Contact Preference:    [phone]    Comment: Pt is currently scheduled for 10/21 but is requesting urgent appt, nothing sooner available in epic. He was seen in the ER yesterday but states he has a lot of blood and burning while urinating. Please call to advise

## 2024-10-03 NOTE — TELEPHONE ENCOUNTER
Called and LVM for patient that all we have is the 10/21 appointment that we scheduled him for. He has antibiotics for 7 days, the medication will help if not improve the UTI. I explained to the patient that we have one Urologist and he is only here 2 days a week and the NP is out of the office for 2 wks. He can call his PCP Dr. Rudolph and see if they have anything earlier then the 21st.

## 2024-10-03 NOTE — TELEPHONE ENCOUNTER
----- Message from Pollo sent at 10/3/2024 10:15 AM CDT -----  Type: Needs Medical Advice  Who Called:  pt  Pharmacy name and phone #:    Best Call Back Number: [phone]  Additional Information: pt is calling the office for an appt, he was in the ER with blood in urine and they referred him to a urologists but no appt are showing elza. Please call back to advise thanks

## 2024-10-04 LAB
CHLAMYDIA, AMPLIFIED DNA: NEGATIVE
N GONORRHOEAE, AMPLIFIED DNA: NEGATIVE

## 2024-10-21 ENCOUNTER — OFFICE VISIT (OUTPATIENT)
Dept: UROLOGY | Facility: CLINIC | Age: 49
End: 2024-10-21
Payer: MEDICAID

## 2024-10-21 VITALS
SYSTOLIC BLOOD PRESSURE: 108 MMHG | HEART RATE: 67 BPM | HEIGHT: 72 IN | DIASTOLIC BLOOD PRESSURE: 74 MMHG | BODY MASS INDEX: 26.34 KG/M2 | WEIGHT: 194.44 LBS

## 2024-10-21 DIAGNOSIS — N13.8 BPH WITH OBSTRUCTION/LOWER URINARY TRACT SYMPTOMS: ICD-10-CM

## 2024-10-21 DIAGNOSIS — R31.0 GROSS HEMATURIA: Primary | ICD-10-CM

## 2024-10-21 DIAGNOSIS — N40.1 BPH WITH OBSTRUCTION/LOWER URINARY TRACT SYMPTOMS: ICD-10-CM

## 2024-10-21 PROCEDURE — 1159F MED LIST DOCD IN RCRD: CPT | Mod: CPTII,,, | Performed by: STUDENT IN AN ORGANIZED HEALTH CARE EDUCATION/TRAINING PROGRAM

## 2024-10-21 PROCEDURE — 99999 PR PBB SHADOW E&M-EST. PATIENT-LVL IV: CPT | Mod: PBBFAC,,, | Performed by: STUDENT IN AN ORGANIZED HEALTH CARE EDUCATION/TRAINING PROGRAM

## 2024-10-21 PROCEDURE — 3008F BODY MASS INDEX DOCD: CPT | Mod: CPTII,,, | Performed by: STUDENT IN AN ORGANIZED HEALTH CARE EDUCATION/TRAINING PROGRAM

## 2024-10-21 PROCEDURE — 3078F DIAST BP <80 MM HG: CPT | Mod: CPTII,,, | Performed by: STUDENT IN AN ORGANIZED HEALTH CARE EDUCATION/TRAINING PROGRAM

## 2024-10-21 PROCEDURE — 99214 OFFICE O/P EST MOD 30 MIN: CPT | Mod: PBBFAC,PN | Performed by: STUDENT IN AN ORGANIZED HEALTH CARE EDUCATION/TRAINING PROGRAM

## 2024-10-21 PROCEDURE — 99204 OFFICE O/P NEW MOD 45 MIN: CPT | Mod: S$PBB,,, | Performed by: STUDENT IN AN ORGANIZED HEALTH CARE EDUCATION/TRAINING PROGRAM

## 2024-10-21 PROCEDURE — 3074F SYST BP LT 130 MM HG: CPT | Mod: CPTII,,, | Performed by: STUDENT IN AN ORGANIZED HEALTH CARE EDUCATION/TRAINING PROGRAM

## 2024-10-21 RX ORDER — ATORVASTATIN CALCIUM 10 MG/1
10 TABLET, FILM COATED ORAL
COMMUNITY

## 2024-10-21 RX ORDER — ERGOCALCIFEROL 1.25 1/1
50000 CAPSULE ORAL
COMMUNITY

## 2024-10-21 RX ORDER — SULFAMETHOXAZOLE AND TRIMETHOPRIM 800; 160 MG/1; MG/1
1 TABLET ORAL EVERY 12 HOURS
COMMUNITY
Start: 2024-10-16

## 2024-10-21 RX ORDER — TAMSULOSIN HYDROCHLORIDE 0.4 MG/1
0.4 CAPSULE ORAL DAILY
Qty: 30 CAPSULE | Refills: 11 | Status: SHIPPED | OUTPATIENT
Start: 2024-10-21 | End: 2025-10-21

## 2024-10-21 NOTE — PROGRESS NOTES
[facility] - Urology [facility] [address]   Clinic Note    SUBJECTIVE:     Chief Complaint: UTIs, gross hematurai    History of Present Illness:  Dennis Oden is a 49 y.o. male who presents to clinic for UTIs, gross hematuria. He is new to our clinic referred by Other. Referral from Other.    He was seen in the ED 10/2/24 with dysuria and gross hematuria. UA was nitrite positive but was not sent for culture; he was discharged with Ceftin.   CT RSS shows no urolithiasis or hydronephrosis.    Went to urgent care 10/16 with similar symptoms, given Bactrim for LE+ urine.     Refused to fill out IPSS but reports hesitancy, weak stream, nocturia.       Anticoagulation:  No    OBJECTIVE:     Estimated body mass index is 26.37 kg/m² as calculated from the following:    Height as of this encounter: 6' (1.829 m).    Weight as of this encounter: 88.2 kg (194 lb 7.1 oz).    Vital Signs (Most Recent)  Pulse: 67 (10/21/24 1324)  BP: 108/74 (10/21/24 1324)    Physical Exam  Vitals reviewed.   Constitutional:       Appearance: Normal appearance.   HENT:      Head: Normocephalic and atraumatic.   Cardiovascular:      Rate and Rhythm: Normal rate.   Pulmonary:      Effort: Pulmonary effort is normal.   Abdominal:      General: Abdomen is flat.   Musculoskeletal:         General: Normal range of motion.   Skin:     General: Skin is warm and dry.   Neurological:      General: No focal deficit present.      Mental Status: He is alert and oriented to person, place, and time.   Psychiatric:         Mood and Affect: Mood normal.         Behavior: Behavior normal.         Thought Content: Thought content normal.         Judgment: Judgment normal.         Lab Results   Component Value Date    BUN 14 10/02/2024    CREATININE 0.9 10/02/2024    WBC 6.75 10/02/2024    HGB 14.0 10/02/2024    HCT 39.8 (L) 10/02/2024     10/02/2024    AST 15 10/02/2024    ALT 30 10/02/2024    ALKPHOS 74 10/02/2024    ALBUMIN 4.3 10/02/2024        No results found for:  ""PSA", "PSADIAG", "PSAFREE", "PSAFREEPCT", "PSARAT"    UA - unable to give specimen  Random bladder scan - 0 cc    ASSESSMENT     1. Gross hematuria    2. BPH with obstruction/lower urinary tract symptoms      PLAN:   1. Gross hematuria  -     CT Urogram Abd Pelvis W WO; Future; Expected date: 10/21/2024  -     Case Request Operating Room: CYSTOSCOPY    2. BPH with obstruction/lower urinary tract symptoms    Other orders  -     tamsulosin (FLOMAX) 0.4 mg Cap; Take 1 capsule (0.4 mg total) by mouth once daily.  Dispense: 30 capsule; Refill: 11       Start Flomax. Will complete gross hematuria workup with CT urogram and cystoscopy (under sedation at patient request.)    Vazquez Acuna MD         "

## 2024-10-28 ENCOUNTER — TELEPHONE (OUTPATIENT)
Dept: UROLOGY | Facility: CLINIC | Age: 49
End: 2024-10-28
Payer: MEDICAID

## 2024-11-11 ENCOUNTER — TELEPHONE (OUTPATIENT)
Dept: UROLOGY | Facility: CLINIC | Age: 49
End: 2024-11-11
Payer: MEDICAID

## 2024-11-11 NOTE — TELEPHONE ENCOUNTER
----- Message from Kathy sent at 11/11/2024  8:27 AM CST -----  Regarding: Pt called states he missed a call wld like to speak with someone regardomg this matter  Contact: [phone]  Name of Who is Calling:VIANCA PRATER [[id number]]        What is the request in detail:Pt called states he missed a call wld like to speak with someone regardomg this matter. Please advise         Can the clinic reply by [facility]:No        What Number to Call Back if not in [facility]: Telephone Information:  Mobile          [phone]

## 2024-11-12 ENCOUNTER — TELEPHONE (OUTPATIENT)
Dept: UROLOGY | Facility: CLINIC | Age: 49
End: 2024-11-12
Payer: MEDICAID

## 2024-11-12 NOTE — TELEPHONE ENCOUNTER
----- Message from Yudy sent at 11/11/2024  5:01 PM CST -----  Patient Returning a call    Who is calling?  Patient  Who called patient?  Office  Call back or [facility] message?  Call back  Call back number: [phone]  Pt is requesting a call back at either 8am or 4pm

## 2024-11-20 ENCOUNTER — TELEPHONE (OUTPATIENT)
Dept: UROLOGY | Facility: CLINIC | Age: 49
End: 2024-11-20

## 2024-11-20 ENCOUNTER — OFFICE VISIT (OUTPATIENT)
Dept: UROLOGY | Facility: CLINIC | Age: 49
End: 2024-11-20
Payer: MEDICAID

## 2024-11-20 VITALS
BODY MASS INDEX: 26.32 KG/M2 | HEIGHT: 72 IN | DIASTOLIC BLOOD PRESSURE: 83 MMHG | SYSTOLIC BLOOD PRESSURE: 130 MMHG | WEIGHT: 194.31 LBS | HEART RATE: 60 BPM

## 2024-11-20 DIAGNOSIS — N52.9 ERECTILE DYSFUNCTION, UNSPECIFIED ERECTILE DYSFUNCTION TYPE: ICD-10-CM

## 2024-11-20 DIAGNOSIS — N40.1 BPH WITH OBSTRUCTION/LOWER URINARY TRACT SYMPTOMS: Primary | ICD-10-CM

## 2024-11-20 DIAGNOSIS — N13.8 BPH WITH OBSTRUCTION/LOWER URINARY TRACT SYMPTOMS: Primary | ICD-10-CM

## 2024-11-20 DIAGNOSIS — R10.2 PELVIC PAIN: ICD-10-CM

## 2024-11-20 PROCEDURE — 3008F BODY MASS INDEX DOCD: CPT | Mod: CPTII,,, | Performed by: STUDENT IN AN ORGANIZED HEALTH CARE EDUCATION/TRAINING PROGRAM

## 2024-11-20 PROCEDURE — 99213 OFFICE O/P EST LOW 20 MIN: CPT | Mod: PBBFAC,PN | Performed by: STUDENT IN AN ORGANIZED HEALTH CARE EDUCATION/TRAINING PROGRAM

## 2024-11-20 PROCEDURE — 1159F MED LIST DOCD IN RCRD: CPT | Mod: CPTII,,, | Performed by: STUDENT IN AN ORGANIZED HEALTH CARE EDUCATION/TRAINING PROGRAM

## 2024-11-20 PROCEDURE — 99215 OFFICE O/P EST HI 40 MIN: CPT | Mod: S$PBB,,, | Performed by: STUDENT IN AN ORGANIZED HEALTH CARE EDUCATION/TRAINING PROGRAM

## 2024-11-20 PROCEDURE — 99999 PR PBB SHADOW E&M-EST. PATIENT-LVL III: CPT | Mod: PBBFAC,,, | Performed by: STUDENT IN AN ORGANIZED HEALTH CARE EDUCATION/TRAINING PROGRAM

## 2024-11-20 PROCEDURE — 3079F DIAST BP 80-89 MM HG: CPT | Mod: CPTII,,, | Performed by: STUDENT IN AN ORGANIZED HEALTH CARE EDUCATION/TRAINING PROGRAM

## 2024-11-20 PROCEDURE — 3075F SYST BP GE 130 - 139MM HG: CPT | Mod: CPTII,,, | Performed by: STUDENT IN AN ORGANIZED HEALTH CARE EDUCATION/TRAINING PROGRAM

## 2024-11-20 RX ORDER — TADALAFIL 5 MG/1
5 TABLET ORAL DAILY
Qty: 30 TABLET | Refills: 11 | Status: SHIPPED | OUTPATIENT
Start: 2024-11-20 | End: 2025-11-20

## 2024-11-20 RX ORDER — TAMSULOSIN HYDROCHLORIDE 0.4 MG/1
0.4 CAPSULE ORAL 2 TIMES DAILY
Qty: 60 CAPSULE | Refills: 11 | Status: SHIPPED | OUTPATIENT
Start: 2024-11-20 | End: 2025-11-20

## 2024-11-22 DIAGNOSIS — R79.89 LOW TESTOSTERONE IN MALE: Primary | ICD-10-CM

## 2024-12-03 ENCOUNTER — TELEPHONE (OUTPATIENT)
Dept: UROLOGY | Facility: CLINIC | Age: 49
End: 2024-12-03
Payer: MEDICAID

## 2024-12-03 ENCOUNTER — PATIENT MESSAGE (OUTPATIENT)
Dept: UROLOGY | Facility: CLINIC | Age: 49
End: 2024-12-03
Payer: MEDICAID

## 2024-12-04 ENCOUNTER — PATIENT MESSAGE (OUTPATIENT)
Dept: UROLOGY | Facility: CLINIC | Age: 49
End: 2024-12-04
Payer: MEDICAID

## 2024-12-04 DIAGNOSIS — R79.89 LOW TESTOSTERONE IN MALE: Primary | ICD-10-CM

## 2024-12-04 RX ORDER — TESTOSTERONE CYPIONATE 200 MG/ML
100 INJECTION, SOLUTION INTRAMUSCULAR
Qty: 5 ML | Refills: 3 | Status: SHIPPED | OUTPATIENT
Start: 2024-12-04

## 2024-12-04 NOTE — TELEPHONE ENCOUNTER
Attempted to call patient to to inform him that a prescription has been sent to pharmacy.  Patient should complete repeat blood work CBC, Estrogens, Total, Prostate Specific Antigen, Diagnostic and Testosterone at 3 month follow up with Dr. Acuna.  Advised patient to give us a call with any questions or concerns.    ISAÍAS William

## 2024-12-05 ENCOUNTER — TELEPHONE (OUTPATIENT)
Dept: UROLOGY | Facility: CLINIC | Age: 49
End: 2024-12-05
Payer: MEDICAID

## 2024-12-05 NOTE — TELEPHONE ENCOUNTER
----- Message from Kiki Cage sent at 12/5/2024 11:41 AM CST -----  Regarding: Advise  Contact: [phone]  Type:  Advice    Who Called: Patient    Would the patient rather a call back or a response via [facility]? Call Back    Best Call Back Number: [phone]    Additional Information: Patient is calling in reference to medication: testosterone cypionate (DEPOTESTOTERONE CYPIONATE) 200 mg/mL injection. Pt states he has never had to inject himself and would like a call from office. Pt is stating he would like to come in to get medication injected if possible.

## 2024-12-06 ENCOUNTER — TELEPHONE (OUTPATIENT)
Dept: OTOLARYNGOLOGY | Facility: CLINIC | Age: 49
End: 2024-12-06
Payer: MEDICAID

## 2024-12-06 NOTE — TELEPHONE ENCOUNTER
Spoke with patient.  Informed patient we can get him scheduled for teaching or he can try the androgel.  Patient states he scheduled an appointment with you Monday to discuss possible biopsy and other issues.  Patient states he will bring medication in for us to teach him how to do the injection and he would also like us to send a prescription of the androgel.  Patient was told he can not get both and did not have clear understanding of why not.  Patient informed staff that he can try the injections here and if it does not work he can try the androgel.  INDERJIT Almaraz LPN

## 2024-12-09 ENCOUNTER — OFFICE VISIT (OUTPATIENT)
Dept: UROLOGY | Facility: CLINIC | Age: 49
End: 2024-12-09
Payer: MEDICAID

## 2024-12-09 VITALS
BODY MASS INDEX: 26.67 KG/M2 | SYSTOLIC BLOOD PRESSURE: 125 MMHG | HEIGHT: 72 IN | HEART RATE: 73 BPM | DIASTOLIC BLOOD PRESSURE: 83 MMHG | WEIGHT: 196.88 LBS

## 2024-12-09 DIAGNOSIS — R79.89 LOW TESTOSTERONE LEVEL IN MALE: Primary | ICD-10-CM

## 2024-12-09 DIAGNOSIS — N52.9 ERECTILE DYSFUNCTION, UNSPECIFIED ERECTILE DYSFUNCTION TYPE: ICD-10-CM

## 2024-12-09 PROCEDURE — 3079F DIAST BP 80-89 MM HG: CPT | Mod: CPTII,,, | Performed by: STUDENT IN AN ORGANIZED HEALTH CARE EDUCATION/TRAINING PROGRAM

## 2024-12-09 PROCEDURE — 99213 OFFICE O/P EST LOW 20 MIN: CPT | Mod: PBBFAC,PN | Performed by: STUDENT IN AN ORGANIZED HEALTH CARE EDUCATION/TRAINING PROGRAM

## 2024-12-09 PROCEDURE — 3008F BODY MASS INDEX DOCD: CPT | Mod: CPTII,,, | Performed by: STUDENT IN AN ORGANIZED HEALTH CARE EDUCATION/TRAINING PROGRAM

## 2024-12-09 PROCEDURE — 99214 OFFICE O/P EST MOD 30 MIN: CPT | Mod: S$PBB,,, | Performed by: STUDENT IN AN ORGANIZED HEALTH CARE EDUCATION/TRAINING PROGRAM

## 2024-12-09 PROCEDURE — 99999 PR PBB SHADOW E&M-EST. PATIENT-LVL III: CPT | Mod: PBBFAC,,, | Performed by: STUDENT IN AN ORGANIZED HEALTH CARE EDUCATION/TRAINING PROGRAM

## 2024-12-09 PROCEDURE — 1159F MED LIST DOCD IN RCRD: CPT | Mod: CPTII,,, | Performed by: STUDENT IN AN ORGANIZED HEALTH CARE EDUCATION/TRAINING PROGRAM

## 2024-12-09 PROCEDURE — 3074F SYST BP LT 130 MM HG: CPT | Mod: CPTII,,, | Performed by: STUDENT IN AN ORGANIZED HEALTH CARE EDUCATION/TRAINING PROGRAM

## 2024-12-09 RX ORDER — TESTOSTERONE 20.25 MG/1.25G
20.25 GEL TOPICAL DAILY
Qty: 75 G | Refills: 5 | Status: SHIPPED | OUTPATIENT
Start: 2024-12-09 | End: 2025-06-09

## 2024-12-09 RX ORDER — TADALAFIL 5 MG/1
5 TABLET ORAL DAILY
Qty: 90 TABLET | Refills: 3 | Status: SHIPPED | OUTPATIENT
Start: 2024-12-09 | End: 2025-12-09

## 2024-12-09 RX ORDER — TAMSULOSIN HYDROCHLORIDE 0.4 MG/1
0.4 CAPSULE ORAL 2 TIMES DAILY
Qty: 180 CAPSULE | Refills: 3 | Status: SHIPPED | OUTPATIENT
Start: 2024-12-09 | End: 2025-12-09

## 2024-12-09 NOTE — PROGRESS NOTES
[facility] - [facility] [address]   Clinic Note    SUBJECTIVE:     Chief Complaint: BPH with LUTS    History of Present Illness:  Dennis Oden is a 49 y.o. male who presents to clinic for BPH. He is new to our clinic referred by No ref. provider found. Referral from No ref. provider found.    He was seen in the ED 10/2/24 with dysuria and gross hematuria. UA was nitrite positive but was not sent for culture; he was discharged with Ceftin.   CT RSS shows no urolithiasis or hydronephrosis.    Went to urgent care 10/16 with similar symptoms, given Bactrim for LE+ urine.     On Flomax. AUASS today 26/6.   CT Urogram showed 24 cc prostate, no upper tract filling defects. Cystoscopy showed bilobar prostatic enlargment with slight high bladder neck.   Patient reports minimal improvement on Flomax, now complaining of pelvic pain and ED.     Patient has numerous questions and is difficult to engage; frequently interrupts, repeats questions that have been discussed.    At last visit, after much discussion, patient was started on Cialis, referred to pelvic floor PT. Flomax was increased to BID.  AM T x 2 were low, so Rx was sent for testosterone cypionate 100 mg weekly. Patient is here for injection teaching, does not think he can do the injections at home.    Patient has numerous questions, many of which were answered at last appointment.     Anticoagulation:  No    OBJECTIVE:     Estimated body mass index is 26.7 kg/m² as calculated from the following:    Height as of this encounter: 6' (1.829 m).    Weight as of this encounter: 89.3 kg (196 lb 13.9 oz).    Vital Signs (Most Recent)  Pulse: 73 (12/09/24 1125)  BP: 125/83 (12/09/24 1125)    Physical Exam  Vitals reviewed.   Constitutional:       Appearance: Normal appearance.   HENT:      Head: Normocephalic and atraumatic.   Cardiovascular:      Rate and Rhythm: Normal rate.   Pulmonary:      Effort: Pulmonary effort is normal.   Abdominal:      General: Abdomen is flat.  "  Musculoskeletal:         General: Normal range of motion.   Skin:     General: Skin is warm and dry.   Neurological:      General: No focal deficit present.      Mental Status: He is alert and oriented to person, place, and time.   Psychiatric:         Mood and Affect: Mood normal.         Behavior: Behavior normal.         Thought Content: Thought content normal.         Judgment: Judgment normal.         Lab Results   Component Value Date    BUN 14 10/02/2024    CREATININE 0.9 10/02/2024    WBC 6.75 10/02/2024    HGB 14.0 10/02/2024    HCT 39.8 (L) 10/02/2024     10/02/2024    AST 15 10/02/2024    ALT 30 10/02/2024    ALKPHOS 74 10/02/2024    ALBUMIN 4.3 10/02/2024        No results found for: "PSA", "PSADIAG", "PSAFREE", "PSAFREEPCT", "PSARAT"        ASSESSMENT     1. Low testosterone level in male    2. Erectile dysfunction, unspecified erectile dysfunction type          PLAN:   1. Low testosterone level in male    2. Erectile dysfunction, unspecified erectile dysfunction type  -     tadalafiL (CIALIS) 5 MG tablet; Take 1 tablet (5 mg total) by mouth once daily.  Dispense: 90 tablet; Refill: 3    Other orders  -     tamsulosin (FLOMAX) 0.4 mg Cap; Take 1 capsule (0.4 mg total) by mouth 2 (two) times a day.  Dispense: 180 capsule; Refill: 3        Continue Flomax BID and Cialis 5 mg daily. 90 day supply for both sent. F/u with pelvic floor PT as scheduled (starts next week)  Patient not comfortable doing injections at home. Will send Rx for Androgel instead.    Vazquez Acuna MD             "

## 2024-12-12 ENCOUNTER — CLINICAL SUPPORT (OUTPATIENT)
Dept: REHABILITATION | Facility: HOSPITAL | Age: 49
End: 2024-12-12
Attending: STUDENT IN AN ORGANIZED HEALTH CARE EDUCATION/TRAINING PROGRAM
Payer: MEDICAID

## 2024-12-12 DIAGNOSIS — M25.651 DECREASED RANGE OF MOTION OF BOTH HIPS: ICD-10-CM

## 2024-12-12 DIAGNOSIS — M62.89 PELVIC FLOOR DYSFUNCTION: Primary | ICD-10-CM

## 2024-12-12 DIAGNOSIS — R10.2 PELVIC PAIN: ICD-10-CM

## 2024-12-12 DIAGNOSIS — N52.9 ERECTILE DYSFUNCTION, UNSPECIFIED ERECTILE DYSFUNCTION TYPE: ICD-10-CM

## 2024-12-12 DIAGNOSIS — M25.652 DECREASED RANGE OF MOTION OF BOTH HIPS: ICD-10-CM

## 2024-12-12 PROCEDURE — 97161 PT EVAL LOW COMPLEX 20 MIN: CPT | Mod: PO

## 2024-12-12 NOTE — PATIENT INSTRUCTIONS
1) Increase your water intake (aim for 64-90 ounces of water)  2)  Sit on a U-shaped cushion

## 2024-12-16 PROBLEM — M62.89 PELVIC FLOOR DYSFUNCTION: Status: ACTIVE | Noted: 2024-12-16

## 2024-12-16 PROBLEM — M25.652 DECREASED RANGE OF MOTION OF BOTH HIPS: Status: ACTIVE | Noted: 2024-12-16

## 2024-12-16 PROBLEM — M25.651 DECREASED RANGE OF MOTION OF BOTH HIPS: Status: ACTIVE | Noted: 2024-12-16

## 2024-12-16 NOTE — PLAN OF CARE
[facility]   Physical Therapy Initial Evaluation        Date: 12/12/2024   Name: Dennis Oden  Clinic Number: [id number]    Therapy Diagnosis:   Encounter Diagnoses   Name Primary?    Erectile dysfunction, unspecified erectile dysfunction type     Pelvic pain     Pelvic floor dysfunction Yes    Decreased range of motion of both hips      Physician: Vazquez Acuna MD    Physician Orders: PT Eval and Treat   Medical Diagnosis from Referral:  Erectile dysfunction, unspecified erectile dysfunction type [N52.9], Pelvic pain [R10.2]   Evaluation Date: 12/12/2024  Authorization Period Expiration: 12-31-24  Plan of Care Expiration: 3/7/2025  Progress Note Due: 1/12/2025  Visit # / Visits authorized: 1/ 1   FOTO: 1/3    Precautions: Standard     Time In: 2:00 pm (patient arrived late)  Time Out: 2: 45 pm  Total Appointment Time (timed & untimed codes): 45 minutes    SUBJECTIVE       Date of onset: 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 Doctor wants him to try therapy before any surgery. 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 Hurts to pee and feels like something locked up. Sometimes the pain is better or worse, but he does not have off days. They checked the prostate and did not find cancer. Pain has really been worse since stopping exercise.    Function History:  Hormone replacement therapy or testosterone use? No  Surgical treatments for prostate cancer or other prostate issues?  No    Pain History:  Pelvic Pain with: with ejaculation , with orgasm , and sexual activity  Pain associated with penetration superficial/deep? both  Testicular pain? No, deeper  Perineal pain? Yes  Climacturia?  Yes  Rectal pain not associated with urination? Yes  Pain beween testicles and rectum not associated with urination? Yes  Pain below waist (pubic or bladder area)  not associated with urination? Yes    Current 0/10, worst 8/10, best 0/10   Description: Burning  Aggravating Factors/Activities that cause symptoms: sexual activities and sitting    Easing Factors: one stretch     Bladder/Bowel History:   Frequency of urination:   Daytime: 2-3 hours; sometimes will go 2-3x an hour            Nighttime: sometimes will wake up once  Difficulty initiating urine stream: Yes  Urine stream: delayed; weak sometimes   Complete emptying: No  Bladder leakage: No  Urinary Urgency: Yes.  Able to delay the urge for at least 15 minute(s).  Pain with delaying the urge to urinate: Yes .     Frequency of bowel movements: once a day  Difficulty initiating BM: Yes  Quality/Shape of BM: Nitro Stool Chart type 2  Does Patient Feel Empty after BM? No  Bowel Urgency: No.  Able to delay the urge for at least 15 minute(s).  Fiber Supplements or Laxative Use? Yes . Mediterranean drink - similar to yogurt.    Pain with BM: No   Colon leakage: No    Form of protection: none reported    Types of fluid intake: not enough water. 2 bottles/day.   Diet: too many carbs   Habitus: well developed, well nourished  Abuse/Neglect: none reported         Medical History: Dennis  has no past medical history on file.     Surgical History: Dennis Oden  has a past surgical history that includes Cystoscopy (11/05/2024) and Cystoscopy (N/A, 11/5/2024).    Medications: Dennis has a current medication list which includes the following prescription(s): atorvastatin, sulfamethoxazole-trimethoprim 800-160mg, tadalafil, tamsulosin, testosterone, testosterone cypionate, and vitamin d2.    Allergies: Review of patient's allergies indicates:  No Known Allergies     Imaging: None reported for this condition     Prior Therapy/Previous treatment included: None reported for this condition   Social History/Living Arrangements: lives with brother in law   Current exercise: no longer exercising   Occupation: [employer]    Prior Level  of Function: Pt was independent with all ADLs and iADLs without pain, no reports of incontinence of bowel or bladder.  Current Level of Function: Pelvic pain impacting ADLs including work duties of prolonged sitting     Constitutional Symptoms Review: The patient denies having any constitutional symptoms.     Flags Screening  Red Flags:The patient was screened for red flags and none were identified.      Pts goals: Improve his pain     OBJECTIVE     See EMR under MEDIA for written consent provided 12/12/2024  Chaperone: deferred     ORTHO SCREEN  Posture in sitting: slouched  and shoulders resting back onto chair with posterior pelvic tilt  Posture in standing: forward and rounded shoulders  and posterior pelvic tilt   Pelvic alignment: posterior pelvic tilt   SI Joint Palpation: Tenderness to the right SI joint palpation. Tenderness to the left SI joint palpation.   Palpation: tenderness at right sacrotuberous ligament  Adductor Palpation: hypertonic    Squat: decreased weight-bearing in heels, excessive anterior tibia translation, and loss of lumbar lordosis with deepening squat    Special Tests:   FABERs:  -   ELISA:+ on R  Hip Scour: -  Slump: -    Flexibility:       Hamstrings: R = 30 degrees limited ; L = 20 degrees limited     Marquise Test Right  Left    Iliopsoas + +   Rectus Femoris  + +       Joint Mobility: significant restriction into IR/ER B with passive movement. Compensations made during stretches    Palpation: greater trochanteric tenderness B    Edema: none    ABDOMINAL WALL ASSESSMENT  Palpation: unable to accurately assess secondary to guarding- no tenderness   Abdominal strength: Rectus abdominus: 4/5     Transverse abdominus: fair activation, compensations present  Scarring: none noted  Pelvic Floor Muscle and Transverse Abdominus Synergy: present  Diastasis: absent     BREATHING MECHANICS ASSESSMENT   Thorax Assessment During Quiet Respiration: WNL excursion of ribcage and WNL excursion of  abdominal wall  Thorax Assessment During Deep Respiration: paradoxical (abdominal wall contracts during inhalation)      Intake Outcome Measures for FOTO Survey    Therapist reviewed FOTO scores for Dennis Oden on 12/12/2024.     FOTO report- see Media section in Epic or account episode details in FOTO.      Intake Score:   Provided, not finished         TREATMENT        Treatment Time In: 2:35 pm  Treatment Time Out: 2:45 pm  Total Treatment time (time-based codes) separate from Evaluation: 10 minutes    Therapeutic Activity Patient participated in dynamic functional therapeutic activities to improve functional performance for 10 minutes. Including: Education as described below.     [x] bladder irritants, anatomy/physiology of pelvic floor, posture/body mechanices, double voiding techniques, and isometric abdominal exercises, siting mechanics with U-shaped cushion  [x] Instruction on diaphragmatic breathing   [x] Education on visual cues/mental imagery for pelvic floor relaxation  [x] Pelvic anatomy edu and impact on current level of function   [x] HEP building      Written Home Exercises and Education Provided: yes. Exercises were reviewed and Dennis was able to demonstrate them prior to the end of the session.  Dennis demonstrated good  understanding of the education provided. See EMR under Patient Instructions for exercises and education provided during therapy sessions.      ASSESSMENT     Dennis is a 49 y.o. male referred to outpatient Physical Therapy with a medical diagnosis of Erectile dysfunction, unspecified erectile dysfunction type [N52.9], Pelvic pain [R10.2] . Pt presents with altered posture, poor knowledge of body mechanics and posture, poor trunk stability, decreased phasic ability of the pelvic muscles, increased tension of the pelvic muscles, increased frequency of urination, increased nocturia, poor fluid intake, poor diet, incomplete urination, dysfunctional voiding, dysfunctional  defecation, and unable to co-contract or co-relax abdominal wall and pelvic floor muscles. Upon assessment, abdominopelvic restrictions are present, likely increasing pain symptoms and symptoms of incomplete urinary/bowel emptying. Prolonged sitting without appropriate support in conjunction with significant reduction in activity levels are also likely impacting his presenting symptoms.     Pt prognosis is Good.   Pt will benefit from skilled outpatient Physical Therapy to address the deficits stated above and in the chart below, provide pt/family education, and to maximize pt's level of independence.     Plan of care discussed with patient: Yes  Pt's spiritual, cultural and educational needs considered and patient is agreeable to the plan of care and goals as stated below:     Anticipated Barriers for therapy: none    Medical Necessity is demonstrated by the following  History  Co-morbidities and personal factors that may impact the plan of care [x] LOW: no personal factors / co-morbidities  [] MODERATE: 1-2 personal factors / co-morbidities  [] HIGH: 3+ personal factors / co-morbidities    Moderate / High Support Documentation:   Co-morbidities affecting plan of care:     Personal Factors:   lifestyle     Examination  Body Structures and Functions, activity limitations and participation restrictions that may impact the plan of care [x] LOW: addressing 1-2 elements  [] MODERATE: 3+ elements  [] HIGH: 4+ elements (please support below)    Moderate / High Support Documentation:      Clinical Presentation [x] LOW: stable  [] MODERATE: Evolving  [] HIGH: Unstable     Decision Making/ Complexity Score: low         Goals:  Short Term Goals: 4 weeks     Pt to demonstrate proper diaphragmatic breathing to help with calming the nervous system in order to decrease pain and to improve abdominal wall and pelvic floor musculature extensibility.   Pt to report being able to complete a half day at work without significant  increase in pain to demonstrate a return towards prior level of function.  Pt to report minimal pain with palpation of abdominal wall due to improvement in soft tissue mobility from moderate to minimal restrictions.  Pt to demonstrate proper positioning on commode with breathing techniques to decrease strain with BM to enable pt to feel empty after BM.   Pt to demonstrate independence with performing bowel massage to help with gut motility.   Pt to be able to bulge pelvic floor which is needed for comfortable BM and complete evacuation.       Long Term Goals: 12 weeks ,     Pt to be discharged with home plan for carry over after discharge.   Pt will be trained and compliant with postural strategies in sitting and standing to improve alignment and decrease pain and muscle fatigue  Patient will report a reduction in pelvic pain with sitting greater than 2 hours by at least 70% to demonstrate a return towards prior level of function    Pt to increase abdominal wall strength by at least 1 muscle grade to improve lumbopelvic stability with ADLs that would normally increase pain.    PLAN     Plan of care Certification: 12/12/2024 to 3/7/2025.    Outpatient Physical Therapy 1-2 times weekly for 12 weeks to include the following interventions: therapeutic exercises, therapeutic activity, neuromuscular re-education, gait training, manual therapy, modalities PRN, patient/family education, and self care/home management, and  dry needling.     Next visit: mobility progressions (abdominal wall, B hips, pelvic floor), ensure appropriate emptying techniques for urinary and bowel.     Daina Erickson, PT

## 2024-12-18 ENCOUNTER — CLINICAL SUPPORT (OUTPATIENT)
Dept: REHABILITATION | Facility: HOSPITAL | Age: 49
End: 2024-12-18
Payer: MEDICAID

## 2024-12-18 ENCOUNTER — DOCUMENTATION ONLY (OUTPATIENT)
Dept: REHABILITATION | Facility: HOSPITAL | Age: 49
End: 2024-12-18
Payer: MEDICAID

## 2024-12-18 DIAGNOSIS — M62.89 PELVIC FLOOR DYSFUNCTION: Primary | ICD-10-CM

## 2024-12-18 DIAGNOSIS — M25.652 DECREASED RANGE OF MOTION OF BOTH HIPS: ICD-10-CM

## 2024-12-18 DIAGNOSIS — M25.651 DECREASED RANGE OF MOTION OF BOTH HIPS: ICD-10-CM

## 2024-12-18 PROCEDURE — 97110 THERAPEUTIC EXERCISES: CPT | Mod: PO,CQ

## 2024-12-18 NOTE — PROGRESS NOTES
Pelvic Health Physical Therapy   Treatment Note     Name: Dennis Oden  Clinic Number: [id number]    Therapy Diagnosis:   Encounter Diagnoses   Name Primary?    Pelvic floor dysfunction Yes    Decreased range of motion of both hips      Physician: Vazquez Acuna MD    Visit Date: 12/18/2024    Physician Orders: PT Eval and Treat   Medical Diagnosis from Referral:  Erectile dysfunction, unspecified erectile dysfunction type [N52.9], Pelvic pain [R10.2]   Evaluation Date: 12/12/2024  Authorization Period Expiration: 12-31-24  Plan of Care Expiration: 3/7/2025  Progress Note Due: 1/12/2025  Visit # / Visits authorized: 1/ 4  FOTO: 1/3     Precautions: Standard      Time In: 1:45 pm   Time Out: 2:40 pm  Total Appointment Time (timed & untimed codes): 55 minutes    Subjective     Pt reports: high levels of pain.  He was somewhat compliant with home exercise program. He is unsure of proper form at this time.   Response to previous treatment: first visit following eval   Functional change: First treatment following eval     Pain: 6/10  Location: bilateral back      Objective     Dennis received therapeutic exercises to develop  strength, endurance, ROM, flexibility, posture, and core stabilization for 45 minutes including:     Standing lumbar extension 20 x   Cat/cow 15 x   Child's pose/prayer stretch 3 x 1 minutes   Supine piriformis 2 x 30 sec   Seated piriformis stretch 2 x 30 sec   Iliopsoas stretch lunge position 3 x 30 sec    Hamstring stretch 3 x 30 sec   Deep squat stretch 3 x 30   Butterfly stretch 3 x 1 minute   Lumbar trunk rotation 20 x   Single knee to chest 3 x 30 biased to abduction     Education on use of lumbar support pillow when driving     Home Exercises Provided and Patient Education Provided     Education provided:   - anatomy/physiology of pelvic floor use of lumbar support pillow when driving   Discussed progression of plan of care with patient; educated pt in activity modification; reviewed  HEP with pt. Pt demonstrated and verbalized understanding of all instruction and was provided with a handout of HEP (see Patient Instructions).  - home exercise program review    Written Home Exercises Provided: Patient instructed to cont prior HEP.  Exercises were reviewed and Dennis was able to demonstrate them prior to the end of the session.  Dennis demonstrated good  understanding of the education provided.     See EMR under Patient Instructions for exercises provided prior visit.    Assessment     Today's treatment focused on review of home exercise program and ensuring patient feels confident performing recommended therapeutic exercises. He required moderate cuing for form correction but was able to return good demonstration of all exercises. Patient is expected to make steady progress towards therapy goals.      Dennis Is progressing well towards his goals.   Pt prognosis is Good.     Pt will continue to benefit from skilled outpatient physical therapy to address the deficits listed in the problem list box on initial evaluation, provide pt/family education and to maximize pt's level of independence in the home and community environment.     Pt's spiritual, cultural and educational needs considered and pt agreeable to plan of care and goals.     Anticipated barriers to physical therapy: none    Goals: Goals:  Short Term Goals: 4 weeks      Pt to demonstrate proper diaphragmatic breathing to help with calming the nervous system in order to decrease pain and to improve abdominal wall and pelvic floor musculature extensibility.   Pt to report being able to complete a half day at work without significant increase in pain to demonstrate a return towards prior level of function.  Pt to report minimal pain with palpation of abdominal wall due to improvement in soft tissue mobility from moderate to minimal restrictions.  Pt to demonstrate proper positioning on commode with breathing techniques to decrease  strain with BM to enable pt to feel empty after BM.   Pt to demonstrate independence with performing bowel massage to help with gut motility.   Pt to be able to bulge pelvic floor which is needed for comfortable BM and complete evacuation.         Long Term Goals: 12 weeks ,      Pt to be discharged with home plan for carry over after discharge.   Pt will be trained and compliant with postural strategies in sitting and standing to improve alignment and decrease pain and muscle fatigue  Patient will report a reduction in pelvic pain with sitting greater than 2 hours by at least 70% to demonstrate a return towards prior level of function    Pt to increase abdominal wall strength by at least 1 muscle grade to improve lumbopelvic stability with ADLs that would normally increase pain.       Plan     Plan of care Certification: 12/12/2024 to 3/7/2025.     Outpatient Physical Therapy 1-2 times weekly for 12 weeks     Next visit: mobility progressions (abdominal wall, B hips, pelvic floor), ensure appropriate emptying techniques for urinary and bowel.     Jazmin Bueno, PTA

## 2025-01-03 ENCOUNTER — CLINICAL SUPPORT (OUTPATIENT)
Dept: REHABILITATION | Facility: HOSPITAL | Age: 50
End: 2025-01-03
Payer: MEDICAID

## 2025-01-03 DIAGNOSIS — M25.652 DECREASED RANGE OF MOTION OF BOTH HIPS: ICD-10-CM

## 2025-01-03 DIAGNOSIS — M25.651 DECREASED RANGE OF MOTION OF BOTH HIPS: ICD-10-CM

## 2025-01-03 DIAGNOSIS — M62.89 PELVIC FLOOR DYSFUNCTION: Primary | ICD-10-CM

## 2025-01-03 PROCEDURE — 97110 THERAPEUTIC EXERCISES: CPT | Mod: PO,CQ

## 2025-01-03 NOTE — PROGRESS NOTES
Pelvic Health Physical Therapy   Treatment Note     Name: Dennis Oden  Clinic Number: [id number]    Therapy Diagnosis:   Encounter Diagnoses   Name Primary?    Pelvic floor dysfunction Yes    Decreased range of motion of both hips      Physician: Vazquez Acuna MD    Visit Date: 1/3/2025    Physician Orders: PT Eval and Treat   Medical Diagnosis from Referral:  Erectile dysfunction, unspecified erectile dysfunction type [N52.9], Pelvic pain [R10.2]   Evaluation Date: 12/12/2024  Authorization Period Expiration: 12-31-24  Plan of Care Expiration: 3/7/2025  Progress Note Due: 1/12/2025  Visit # / Visits authorized: 1/ ?  FOTO: 1/3     Precautions: Standard      Time In: 1200 pm   Time Out: 1255 pm  Total Appointment Time (timed & untimed codes): 55 minutes    Subjective     Pt reports: tries to fit in his home exercises but stays very busy and has a hard time getting exercises in. Pain with urination has been off and on but overall a little bit better. Hasn't tried the towel roll with driving yet.     He was somewhat compliant with home exercise program. He is unsure of proper form at this time.   Response to previous treatment: as noted   Functional change: as noted     Pain: 6/10  Location: bilateral back      Objective     Dennis received therapeutic exercises to develop  strength, endurance, ROM, flexibility, posture, and core stabilization for 45 minutes including:     Long axis distraction bilaterally grade IV on right and grade V on left  Contract relax for external rotation bilaterally  Child's pose/prayer stretch 3 x 1 minutes   Prone press up x 10 repetitions   Seated piriformis stretch 3 x 30 seconds   Seated adductor stretch 3 x 30 seconds   Single knee to chest 3 x 30 seconds   Happy baby modified with towel 3 x 10 breaths   Plank 3 x 20 seconds   Reprinted and reviewed Home Exercise Program       Not this date  Standing lumbar extension 20 x   Cat/cow 15 x   Supine piriformis 2 x 30 sec    Iliopsoas stretch lunge position 3 x 30 sec    Hamstring stretch 3 x 30 sec   Deep squat stretch 3 x 30   Butterfly stretch 3 x 1 minute   Lumbar trunk rotation 20 x   Single knee to chest 3 x 30 biased to abduction     Education on use of lumbar support pillow when driving     Home Exercises Provided and Patient Education Provided     Education provided:   - anatomy/physiology of pelvic floor use of lumbar support pillow when driving   Discussed progression of plan of care with patient; educated pt in activity modification; reviewed HEP with pt. Pt demonstrated and verbalized understanding of all instruction and was provided with a handout of HEP (see Patient Instructions).  - home exercise program review    Written Home Exercises Provided: Patient instructed to cont prior HEP.  Exercises were reviewed and Dennis was able to demonstrate them prior to the end of the session.  Dennis demonstrated good  understanding of the education provided.     See EMR under Patient Instructions for exercises provided prior visit.    Assessment     Dennis verbalizes minimal progress possibly due to difficulty prioritizing home exercises at this time. As a result, treatment this date focused on downtraining. Also incorporated some core strengthening this date without issues. He verbalized improvement noted post. Stressed the importance of being consistent with home exercises. Will progress as able.       Dennis Is progressing well towards his goals.   Pt prognosis is Good.     Pt will continue to benefit from skilled outpatient physical therapy to address the deficits listed in the problem list box on initial evaluation, provide pt/family education and to maximize pt's level of independence in the home and community environment.     Pt's spiritual, cultural and educational needs considered and pt agreeable to plan of care and goals.     Anticipated barriers to physical therapy: none    Goals: Goals:  Short Term Goals: 4  weeks      Pt to demonstrate proper diaphragmatic breathing to help with calming the nervous system in order to decrease pain and to improve abdominal wall and pelvic floor musculature extensibility.   Pt to report being able to complete a half day at work without significant increase in pain to demonstrate a return towards prior level of function.  Pt to report minimal pain with palpation of abdominal wall due to improvement in soft tissue mobility from moderate to minimal restrictions.  Pt to demonstrate proper positioning on commode with breathing techniques to decrease strain with BM to enable pt to feel empty after BM.   Pt to demonstrate independence with performing bowel massage to help with gut motility.   Pt to be able to bulge pelvic floor which is needed for comfortable BM and complete evacuation.         Long Term Goals: 12 weeks ,      Pt to be discharged with home plan for carry over after discharge.   Pt will be trained and compliant with postural strategies in sitting and standing to improve alignment and decrease pain and muscle fatigue  Patient will report a reduction in pelvic pain with sitting greater than 2 hours by at least 70% to demonstrate a return towards prior level of function    Pt to increase abdominal wall strength by at least 1 muscle grade to improve lumbopelvic stability with ADLs that would normally increase pain.       Plan     Plan of care Certification: 12/12/2024 to 3/7/2025.     Outpatient Physical Therapy 1-2 times weekly for 12 weeks     Next visit: mobility progressions (abdominal wall, B hips, pelvic floor), ensure appropriate emptying techniques for urinary and bowel.     Lauren Macias, PTA

## 2025-01-06 ENCOUNTER — CLINICAL SUPPORT (OUTPATIENT)
Dept: REHABILITATION | Facility: HOSPITAL | Age: 50
End: 2025-01-06
Payer: MEDICAID

## 2025-01-06 DIAGNOSIS — M25.651 DECREASED RANGE OF MOTION OF BOTH HIPS: ICD-10-CM

## 2025-01-06 DIAGNOSIS — M25.652 DECREASED RANGE OF MOTION OF BOTH HIPS: ICD-10-CM

## 2025-01-06 DIAGNOSIS — M62.89 PELVIC FLOOR DYSFUNCTION: Primary | ICD-10-CM

## 2025-01-06 PROCEDURE — 97110 THERAPEUTIC EXERCISES: CPT | Mod: PO,CQ

## 2025-01-06 NOTE — PROGRESS NOTES
Pelvic Health Physical Therapy   Treatment Note     Name: Dennis Oden  Clinic Number: [id number]    Therapy Diagnosis:   Encounter Diagnoses   Name Primary?    Pelvic floor dysfunction Yes    Decreased range of motion of both hips      Physician: Vazquez Acuna MD    Visit Date: 1/6/2025    Physician Orders: PT Eval and Treat   Medical Diagnosis from Referral:  Erectile dysfunction, unspecified erectile dysfunction type [N52.9], Pelvic pain [R10.2]   Evaluation Date: 12/12/2024  Authorization Period Expiration: 12-31-24  Plan of Care Expiration: 3/7/2025  Progress Note Due: 1/12/2025  Visit # / Visits authorized: 2/11 (4 total)   FOTO: 1/3     Precautions: Standard      Time In: 1345 pm   Time Out: 1415 pm  Total Appointment Time (timed & untimed codes): 45 minutes    Subjective     Pt reports: Pain with urination has been off and on but overall a little bit better. Has noted that it will get better when he is more consistent with his home exercise program.     He was somewhat compliant with home exercise program. He is unsure of proper form at this time.   Response to previous treatment: as noted   Functional change: as noted     Pain: 6/10  Location: bilateral back      Objective     Dennis received therapeutic exercises to develop  strength, endurance, ROM, flexibility, posture, and core stabilization for 40 minutes including:     Child's pose/prayer stretch 3 x 1 minutes   Frog stretch 1 minute x 3   Seated piriformis stretch 3 x 30 seconds   Seated adductor stretch 3 x 30 seconds   Butterfly stretch 3 x 30   Single knee to chest 3 x 30 seconds   Happy baby modified with towel 3 x 10 breaths   Plank 3 x 20 seconds   Wall plank 30 sec x 3 demonstrated as easier alternative.   Bridges with cues to exhale with rise 3 x 10     Not this date  Long axis distraction bilaterally grade IV on right and grade V on left  Contract relax for external rotation bilaterally  Prone press up x 10 repetitions   Standing  lumbar extension 20 x   Cat/cow 15 x   Supine piriformis 2 x 30 sec   Iliopsoas stretch lunge position 3 x 30 sec    Hamstring stretch 3 x 30 sec   Deep squat stretch 3 x 30   Butterfly stretch 3 x 1 minute   Lumbar trunk rotation 20 x   Single knee to chest 3 x 30 biased to abduction     Education on use of lumbar support pillow when driving     Home Exercises Provided and Patient Education Provided     Education provided:   - anatomy/physiology of pelvic floor use of lumbar support pillow when driving   Discussed progression of plan of care with patient; educated pt in activity modification; reviewed HEP with pt. Pt demonstrated and verbalized understanding of all instruction and was provided with a handout of HEP (see Patient Instructions).  - home exercise program review    Written Home Exercises Provided: Patient instructed to cont prior HEP.  Exercises were reviewed and Dennis was able to demonstrate them prior to the end of the session.  Dennis demonstrated good  understanding of the education provided.     See EMR under Patient Instructions for exercises provided prior visit.    Assessment     Again the importance of being consistent with home exercises was emphasized with patient. Today home exercise program was reviewed with focus on therapeutic exercises that can be done with ease.       Dennis Is progressing well towards his goals.   Pt prognosis is Good.     Pt will continue to benefit from skilled outpatient physical therapy to address the deficits listed in the problem list box on initial evaluation, provide pt/family education and to maximize pt's level of independence in the home and community environment.     Pt's spiritual, cultural and educational needs considered and pt agreeable to plan of care and goals.     Anticipated barriers to physical therapy: none    Goals: Goals:  Short Term Goals: 4 weeks      Pt to demonstrate proper diaphragmatic breathing to help with calming the nervous  system in order to decrease pain and to improve abdominal wall and pelvic floor musculature extensibility.   Pt to report being able to complete a half day at work without significant increase in pain to demonstrate a return towards prior level of function.  Pt to report minimal pain with palpation of abdominal wall due to improvement in soft tissue mobility from moderate to minimal restrictions.  Pt to demonstrate proper positioning on commode with breathing techniques to decrease strain with BM to enable pt to feel empty after BM.   Pt to demonstrate independence with performing bowel massage to help with gut motility.   Pt to be able to bulge pelvic floor which is needed for comfortable BM and complete evacuation.         Long Term Goals: 12 weeks ,      Pt to be discharged with home plan for carry over after discharge.   Pt will be trained and compliant with postural strategies in sitting and standing to improve alignment and decrease pain and muscle fatigue  Patient will report a reduction in pelvic pain with sitting greater than 2 hours by at least 70% to demonstrate a return towards prior level of function    Pt to increase abdominal wall strength by at least 1 muscle grade to improve lumbopelvic stability with ADLs that would normally increase pain.       Plan     Plan of care Certification: 12/12/2024 to 3/7/2025.     Outpatient Physical Therapy 1-2 times weekly for 12 weeks     Next visit: mobility progressions (abdominal wall, B hips, pelvic floor), ensure appropriate emptying techniques for urinary and bowel.     Jazmin Bueno, PTA

## 2025-01-09 ENCOUNTER — CLINICAL SUPPORT (OUTPATIENT)
Dept: REHABILITATION | Facility: HOSPITAL | Age: 50
End: 2025-01-09
Payer: MEDICAID

## 2025-01-09 DIAGNOSIS — M25.652 DECREASED RANGE OF MOTION OF BOTH HIPS: ICD-10-CM

## 2025-01-09 DIAGNOSIS — M62.89 PELVIC FLOOR DYSFUNCTION: Primary | ICD-10-CM

## 2025-01-09 DIAGNOSIS — M25.651 DECREASED RANGE OF MOTION OF BOTH HIPS: ICD-10-CM

## 2025-01-09 PROCEDURE — 97110 THERAPEUTIC EXERCISES: CPT | Mod: PO,CQ

## 2025-01-09 NOTE — PROGRESS NOTES
Pelvic Health Physical Therapy   Treatment Note     Name: Dennis Oden  Clinic Number: [id number]    Therapy Diagnosis:   Encounter Diagnoses   Name Primary?    Pelvic floor dysfunction Yes    Decreased range of motion of both hips      Physician: Vazquez Acuna MD    Visit Date: 1/9/2025    Physician Orders: PT Eval and Treat   Medical Diagnosis from Referral:  Erectile dysfunction, unspecified erectile dysfunction type [N52.9], Pelvic pain [R10.2]   Evaluation Date: 12/12/2024  Authorization Period Expiration: 12-31-24  Plan of Care Expiration: 3/7/2025  Progress Note Due: 1/12/2025  Visit # / Visits authorized: 3/11 (5 total)   FOTO: 1/3     Precautions: Standard      Time In: 1430 pm   Time Out: 1525 pm  Total Appointment Time (timed & untimed codes): 55 minutes    Subjective     Pt reports: moderate pain today that he associates with driving. He does report overall improvement.     He was somewhat compliant with home exercise program.   Response to previous treatment: as noted   Functional change: as noted     Pain: 6/10  Location: bilateral back      Objective     Dennis received therapeutic exercises to develop  strength, endurance, ROM, flexibility, posture, and core stabilization for 55 minutes including:     Child's pose/prayer stretch 3 x 1 minutes   Frog stretch 1 minute x 3   Seated piriformis stretch 3 x 30 seconds   Seated adductor stretch 3 x 30 seconds   Butterfly stretch 3 x 30   Single knee to chest 3 x 30 seconds   Happy baby modified with towel 3 x 10 breaths   Plank 3 x 20 seconds   Wall plank 30 sec x 3 demonstrated as easier alternative.   Single leg bridges 10 x each leg   Iliopsoas stretch lunge position 3 x 30 sec    Hamstring stretch 3 x 30 sec   Deep squat stretch 3 x 30   Butterfly stretch 3 x 1 minute   Cat/cow 15 x     Not this date  Long axis distraction bilaterally grade IV on right and grade V on left  Contract relax for external rotation bilaterally  Prone press up x 10  repetitions   Standing lumbar extension 20 x   Supine piriformis 2 x 30 sec   Lumbar trunk rotation 20 x   Single knee to chest 3 x 30 biased to abduction     Education on use of lumbar support pillow when driving     Home Exercises Provided and Patient Education Provided     Education provided:   - anatomy/physiology of pelvic floor use of lumbar support pillow when driving   Discussed progression of plan of care with patient; educated pt in activity modification; reviewed HEP with pt. Pt demonstrated and verbalized understanding of all instruction and was provided with a handout of HEP (see Patient Instructions).  - home exercise program review    Written Home Exercises Provided: Patient instructed to cont prior HEP.  Exercises were reviewed and Dennis was able to demonstrate them prior to the end of the session.  Dennis demonstrated good  understanding of the education provided.     See EMR under Patient Instructions for exercises provided prior visit.    Assessment     Patient tolerated advancement to single leg bridges. Home exercise program was updated to include this exercise along with planks and frog stretch. Patient was able to return good demonstration of all recommended therapeutic exercises.      Dennis Is progressing well towards his goals.   Pt prognosis is Good.     Pt will continue to benefit from skilled outpatient physical therapy to address the deficits listed in the problem list box on initial evaluation, provide pt/family education and to maximize pt's level of independence in the home and community environment.     Pt's spiritual, cultural and educational needs considered and pt agreeable to plan of care and goals.     Anticipated barriers to physical therapy: none    Goals: Goals:  Short Term Goals: 4 weeks      Pt to demonstrate proper diaphragmatic breathing to help with calming the nervous system in order to decrease pain and to improve abdominal wall and pelvic floor musculature  extensibility.   Pt to report being able to complete a half day at work without significant increase in pain to demonstrate a return towards prior level of function.  Pt to report minimal pain with palpation of abdominal wall due to improvement in soft tissue mobility from moderate to minimal restrictions.  Pt to demonstrate proper positioning on commode with breathing techniques to decrease strain with BM to enable pt to feel empty after BM.   Pt to demonstrate independence with performing bowel massage to help with gut motility.   Pt to be able to bulge pelvic floor which is needed for comfortable BM and complete evacuation.         Long Term Goals: 12 weeks ,      Pt to be discharged with home plan for carry over after discharge.   Pt will be trained and compliant with postural strategies in sitting and standing to improve alignment and decrease pain and muscle fatigue  Patient will report a reduction in pelvic pain with sitting greater than 2 hours by at least 70% to demonstrate a return towards prior level of function    Pt to increase abdominal wall strength by at least 1 muscle grade to improve lumbopelvic stability with ADLs that would normally increase pain.       Plan     Plan of care Certification: 12/12/2024 to 3/7/2025.     Outpatient Physical Therapy 1-2 times weekly for 12 weeks     Next visit: mobility progressions (abdominal wall, B hips, pelvic floor), ensure appropriate emptying techniques for urinary and bowel.     Jazmin Bueno, PTA

## 2025-01-13 ENCOUNTER — CLINICAL SUPPORT (OUTPATIENT)
Dept: REHABILITATION | Facility: HOSPITAL | Age: 50
End: 2025-01-13
Payer: MEDICAID

## 2025-01-13 DIAGNOSIS — M62.89 PELVIC FLOOR DYSFUNCTION: Primary | ICD-10-CM

## 2025-01-13 DIAGNOSIS — M25.652 DECREASED RANGE OF MOTION OF BOTH HIPS: ICD-10-CM

## 2025-01-13 DIAGNOSIS — M25.651 DECREASED RANGE OF MOTION OF BOTH HIPS: ICD-10-CM

## 2025-01-13 PROCEDURE — 97110 THERAPEUTIC EXERCISES: CPT | Mod: PO

## 2025-01-13 NOTE — PROGRESS NOTES
"  Pelvic Health Physical Therapy   Progress/Treatment Note     Name: Dennis Oden  Clinic Number: [id number]    Therapy Diagnosis:   Encounter Diagnoses   Name Primary?    Pelvic floor dysfunction Yes    Decreased range of motion of both hips        Physician: Vazquez Acuna MD    Visit Date: 1/13/2025    Physician Orders: PT Eval and Treat   Medical Diagnosis from Referral:  Erectile dysfunction, unspecified erectile dysfunction type [N52.9], Pelvic pain [R10.2]   Evaluation Date: 12/12/2024  Authorization Period Expiration: 12-31-24  Plan of Care Expiration: 3/7/2025  Progress Note Due: 2/13/2025  Visit # / Visits authorized: 4/11 (5 total)      Precautions: Standard      Time In: 2:30 pm   Time Out: 3:30 pm  Total Appointment Time (timed & untimed codes): 60 minutes    Subjective     Pt reports: still having symptoms but does feel a lot better. Knows he has to keep moving. Only doing a little bit of his program at home.     He was somewhat compliant with home exercise program.   Response to previous treatment: as noted   Functional change: as noted     Pain: 6/10  Location: bilateral back      Objective     IR/ER: restricted significantly on L into both planes of motion (IR worse than ER)  Restricted moderately on R into both planes of motion    Great squat depth with appropriate hip hinge, although loss of lumbar lordosis present    Dennis received therapeutic exercises to develop  strength, endurance, ROM, flexibility, posture, and core stabilization for 60 minutes including:     Child's pose/prayer stretch 3 x 1 minutes   Cat/cow 15 x   Frog stretch 1 minute x 3   [place] stretch 4 x 20 seconds   Seated adductor stretch 3 x 30 seconds   Quadruped hip extensions 3 x 10  Quadruped fire hydrants 4 x 20" orange theraband   Sit to stands + overhead press #30 KB 3 x 8   Jumping jacks 3 x 45"  Scissor jumping + alternating punching 3 x 30"   Punch kicking (HS lengthening and core engagement) 2 x 10 B  Standing HS " "stretch into overhead lumbar extension 2 x 60"    Not this date  Single leg bridges 10 x each leg   Plank 3 x 20 seconds   Wall plank 30 sec x 3 demonstrated as easier alternative.   Iliopsoas stretch lunge position 3 x 30 sec    Long axis distraction bilaterally grade IV on right and grade V on left  Contract relax for external rotation bilaterally  Prone press up x 10 repetitions   Standing lumbar extension 20 x   Lumbar trunk rotation 20 x   Single knee to chest 3 x 30 biased to abduction   Education on use of lumbar support pillow when driving     Home Exercises Provided and Patient Education Provided     Education provided:   - anatomy/physiology of pelvic floor use of lumbar support pillow when driving   Discussed progression of plan of care with patient; educated pt in activity modification; reviewed HEP with pt. Pt demonstrated and verbalized understanding of all instruction and was provided with a handout of HEP (see Patient Instructions).  - home exercise program review    Written Home Exercises Provided: Patient instructed to cont prior HEP.  Exercises were reviewed and Dennis was able to demonstrate them prior to the end of the session.  Dennis demonstrated good  understanding of the education provided.     See EMR under Patient Instructions for exercises provided prior visit.    Assessment     Dennis is tolerating therapy progressions well with improving, but still restricted, hip mobility that is impacting pelvic function. Worked on more functional task progressions this date, incorporating more aerobic activity as this has been shown to reduce pelvic pain in male patients. Cardio deficits were present and mildly limiting. Pt will continue to benefit from skilled outpatient physical therapy to address the deficits listed in the problem list box on initial evaluation, provide pt/family education and to maximize pt's level of independence in the home and community environment.       Dennis Is " progressing well towards his goals.   Pt prognosis is Good.     Pt will continue to benefit from skilled outpatient physical therapy to address the deficits listed in the problem list box on initial evaluation, provide pt/family education and to maximize pt's level of independence in the home and community environment.     Pt's spiritual, cultural and educational needs considered and pt agreeable to plan of care and goals.     Anticipated barriers to physical therapy: none    Goals:   Short Term Goals: 4 weeks      Pt to demonstrate proper diaphragmatic breathing to help with calming the nervous system in order to decrease pain and to improve abdominal wall and pelvic floor musculature extensibility. (PROGRESSING 1/13/2025 )  Pt to report being able to complete a half day at work without significant increase in pain to demonstrate a return towards prior level of function.  (PROGRESSING 1/13/2025 )  Pt to report minimal pain with palpation of abdominal wall due to improvement in soft tissue mobility from moderate to minimal restrictions. (PROGRESSING 1/13/2025 )  Pt to demonstrate proper positioning on commode with breathing techniques to decrease strain with BM to enable pt to feel empty after BM.  (PROGRESSING 1/13/2025 )  Pt to demonstrate independence with performing bowel massage to help with gut motility.  (PROGRESSING 1/13/2025 )  Pt to be able to bulge pelvic floor which is needed for comfortable BM and complete evacuation.  (PROGRESSING 1/13/2025 )        Long Term Goals: 12 weeks      Pt to be discharged with home plan for carry over after discharge.  (PROGRESSING 1/13/2025 )  Pt will be trained and compliant with postural strategies in sitting and standing to improve alignment and decrease pain and muscle fatigue (PROGRESSING 1/13/2025 )  Patient will report a reduction in pelvic pain with sitting greater than 2 hours by at least 70% to demonstrate a return towards prior level of function   (PROGRESSING  1/13/2025 )  Pt to increase abdominal wall strength by at least 1 muscle grade to improve lumbopelvic stability with ADLs that would normally increase pain. (PROGRESSING 1/13/2025 )       Plan     Plan of care Certification: 12/12/2024 to 3/7/2025.     Outpatient Physical Therapy 1-2 times weekly for 12 weeks     Next visit: mobility progressions and increased aerobic activity with hip stability     Daina Erickson, PT

## 2025-01-19 ENCOUNTER — PATIENT MESSAGE (OUTPATIENT)
Dept: REHABILITATION | Facility: HOSPITAL | Age: 50
End: 2025-01-19
Payer: MEDICAID

## 2025-01-27 ENCOUNTER — CLINICAL SUPPORT (OUTPATIENT)
Dept: REHABILITATION | Facility: HOSPITAL | Age: 50
End: 2025-01-27
Payer: MEDICAID

## 2025-01-27 DIAGNOSIS — M25.651 DECREASED RANGE OF MOTION OF BOTH HIPS: ICD-10-CM

## 2025-01-27 DIAGNOSIS — M25.652 DECREASED RANGE OF MOTION OF BOTH HIPS: ICD-10-CM

## 2025-01-27 DIAGNOSIS — M62.89 PELVIC FLOOR DYSFUNCTION: Primary | ICD-10-CM

## 2025-01-27 PROCEDURE — 97110 THERAPEUTIC EXERCISES: CPT | Mod: PO

## 2025-01-27 NOTE — PROGRESS NOTES
"  Pelvic Health Physical Therapy   Treatment Note     Name: Dennis Oden  Clinic Number: [id number]    Therapy Diagnosis:   Encounter Diagnoses   Name Primary?    Pelvic floor dysfunction Yes    Decreased range of motion of both hips        Physician: Vazquez Acuna MD    Visit Date: 1/27/2025    Physician Orders: PT Eval and Treat   Medical Diagnosis from Referral:  Erectile dysfunction, unspecified erectile dysfunction type [N52.9], Pelvic pain [R10.2]   Evaluation Date: 12/12/2024  Authorization Period Expiration: 12-31-24  Plan of Care Expiration: 3/7/2025  Progress Note Due: 2/13/2025  Visit # / Visits authorized: 5/11 (6 total)      Precautions: Standard      Time In: 1:45 pm   Time Out: 3:00 pm  Total Appointment Time (timed & untimed codes): 75 minutes    Subjective     Pt reports: feels locked up despite doing his stretches.  He does a little bit at home, the snow messed him up some. Plans on doing more.       He was somewhat compliant with home exercise program.   Response to previous treatment: as noted   Functional change: as noted     Pain: 6/10  Location: bilateral back      Objective     IR/ER: restricted significantly on L into both planes of motion (IR worse than ER)  Restricted moderately on R into both planes of motion    Great squat depth with appropriate hip hinge, although loss of lumbar lordosis present    Dennis received therapeutic exercises to develop  strength, endurance, ROM, flexibility, posture, and core stabilization for 75 minutes including:     Child's pose/prayer stretch 3 x 1 minutes   Cat/cow 2 x 10   Frog/[place] stretch 1 minute x 3   Seated MARIO stretch 3 x 30"  Alternating adductor stretch in split stance 3 x 30"  Squatting on semi BOSI bilaterally 4 x 10  Quadruped hip extensions 3 x 10. 2"  green ankle band   Quadruped fire hydrants 4 x 20"  blue theraband  Side lying clamshells 4 x 20" blue theraband   Jumping jacks 3 x 30"  Modified plank + push ups 5 x 5  Walking " lunging 4 x 10  Single leg bridges, semi bosu under foot 3 x 10      Not this date:  [place] stretch 4 x 20 seconds   Wall plank 30 sec x 3 demonstrated as easier alternative.   Iliopsoas stretch lunge position 3 x 30 sec    Long axis distraction bilaterally grade IV on right and grade V on left  Lumbar trunk rotation 20 x   Single knee to chest 3 x 30 biased to abduction   Education on use of lumbar support pillow when driving     Home Exercises Provided and Patient Education Provided     Education provided:   - anatomy/physiology of pelvic floor use of lumbar support pillow when driving   Discussed progression of plan of care with patient; educated pt in activity modification; reviewed HEP with pt. Pt demonstrated and verbalized understanding of all instruction and was provided with a handout of HEP (see Patient Instructions).  - home exercise program review    Written Home Exercises Provided: Patient instructed to cont prior HEP.  Exercises were reviewed and Dennis was able to demonstrate them prior to the end of the session.  Dennis demonstrated good  understanding of the education provided.     See EMR under Patient Instructions for exercises provided prior visit.    Assessment     Again, Dennis is tolerating therapy progressions well with improving, but still restricted, hip mobility that is impacting pelvic function. Worked on more functional task progressions this date, incorporating more aerobic activity as this has been shown to reduce pelvic pain in male patients. Cardio deficits were present and mildly limiting.Pt education on importance of smoking reduction/cessation as this limits generalized health and aerobic capacity. Pt will continue to benefit from skilled outpatient physical therapy to address the deficits listed in the problem list box on initial evaluation, provide pt/family education and to maximize pt's level of independence in the home and community environment.       Dennis Is  progressing well towards his goals.   Pt prognosis is Good.     Pt will continue to benefit from skilled outpatient physical therapy to address the deficits listed in the problem list box on initial evaluation, provide pt/family education and to maximize pt's level of independence in the home and community environment.     Pt's spiritual, cultural and educational needs considered and pt agreeable to plan of care and goals.     Anticipated barriers to physical therapy: none    Goals:   Short Term Goals: 4 weeks      Pt to demonstrate proper diaphragmatic breathing to help with calming the nervous system in order to decrease pain and to improve abdominal wall and pelvic floor musculature extensibility. (PROGRESSING 1/27/2025 )  Pt to report being able to complete a half day at work without significant increase in pain to demonstrate a return towards prior level of function.  (PROGRESSING 1/13/2025 )  Pt to report minimal pain with palpation of abdominal wall due to improvement in soft tissue mobility from moderate to minimal restrictions. (PROGRESSING 1/13/2025 )  Pt to demonstrate proper positioning on commode with breathing techniques to decrease strain with BM to enable pt to feel empty after BM.  (PROGRESSING 1/13/2025 )  Pt to demonstrate independence with performing bowel massage to help with gut motility.  (PROGRESSING 1/13/2025 )  Pt to be able to bulge pelvic floor which is needed for comfortable BM and complete evacuation.  (PROGRESSING 1/13/2025 )        Long Term Goals: 12 weeks      Pt to be discharged with home plan for carry over after discharge.  (PROGRESSING 1/13/2025 )  Pt will be trained and compliant with postural strategies in sitting and standing to improve alignment and decrease pain and muscle fatigue (PROGRESSING 1/13/2025 )  Patient will report a reduction in pelvic pain with sitting greater than 2 hours by at least 70% to demonstrate a return towards prior level of function   (PROGRESSING  1/13/2025 )  Pt to increase abdominal wall strength by at least 1 muscle grade to improve lumbopelvic stability with ADLs that would normally increase pain. (PROGRESSING 1/13/2025 )       Plan     Plan of care Certification: 12/12/2024 to 3/7/2025.     Outpatient Physical Therapy 1-2 times weekly for 12 weeks     Next visit: mobility progressions and increased aerobic activity with hip stability     Daina Erickson, PT

## 2025-02-10 ENCOUNTER — CLINICAL SUPPORT (OUTPATIENT)
Dept: REHABILITATION | Facility: HOSPITAL | Age: 50
End: 2025-02-10
Payer: MEDICAID

## 2025-02-10 DIAGNOSIS — M25.652 DECREASED RANGE OF MOTION OF BOTH HIPS: ICD-10-CM

## 2025-02-10 DIAGNOSIS — M62.89 PELVIC FLOOR DYSFUNCTION: Primary | ICD-10-CM

## 2025-02-10 DIAGNOSIS — M25.651 DECREASED RANGE OF MOTION OF BOTH HIPS: ICD-10-CM

## 2025-02-10 PROCEDURE — 97110 THERAPEUTIC EXERCISES: CPT | Mod: PO

## 2025-02-10 NOTE — PROGRESS NOTES
"  Pelvic Health Physical Therapy   Progress/Treatment Note     Name: Dennis Oden  Clinic Number: [id number]    Therapy Diagnosis:   Encounter Diagnoses   Name Primary?    Pelvic floor dysfunction Yes    Decreased range of motion of both hips        Physician: Vazquez Acuna MD    Visit Date: 2/10/2025    Physician Orders: PT Eval and Treat   Medical Diagnosis from Referral:  Erectile dysfunction, unspecified erectile dysfunction type [N52.9], Pelvic pain [R10.2]   Evaluation Date: 12/12/2024  Authorization Period Expiration: 12-31-24  Plan of Care Expiration: 3/7/2025  Progress Note Due: 3/10/2025  Visit # / Visits authorized: 6/11 (7 total)      Precautions: Standard      Time In: 1:45 pm   Time Out: 2:30 pm  Total Appointment Time (timed & untimed codes): 45 minutes    Subjective     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 Has been doing 2-3 things daily (like the cat/cows and other stretches). Next month he is starting yoga after Ramadan. The pain reduces when he goes home and relaxes. Has not been using lumbar or perineal support with driving.     He was somewhat compliant with home exercise program.   Response to previous treatment: as noted   Functional change: as noted     Pain: 4/10  Location: prostate    Objective     IR/ER: restricted significantly on L into both planes of motion (IR worse than ER)  Restricted moderately on R into both planes of motion    Great squat depth with appropriate hip hinge, although loss of lumbar lordosis present    Dennis received therapeutic exercises to develop  strength, endurance, ROM, flexibility, posture, and core stabilization for 45 minutes including:     Child's pose/prayer stretch into frog pose 3 x 1 minutes   Cat/cow 2 x 10   Deep squatting 3 x 45"  Kettle bell swings 3 x 10, #25  Monster walking fwd/bck/lateral x 4 laps each, green ankle band   Walking lunging 4 x 10, #25 KB  Dead lifting 4 x 8, #25 KB  Education on use of lumbar " "support pillow when driving AND [employer]/Yoga classes       Not this date:  Seated MARIO stretch 3 x 30"  Alternating adductor stretch in split stance 3 x 30"  Quadruped hip extensions 3 x 10. 2"  green ankle band   Quadruped fire hydrants 4 x 20"  blue theraband  Side lying clamshells 4 x 20" blue theraband   Jumping jacks 3 x 30"  Modified plank + push ups 5 x 5  Single leg bridges, semi bosu under foot 3 x 10  Wall plank 30 sec x 3 demonstrated as easier alternative.   Single knee to chest 3 x 30 biased to abduction     Home Exercises Provided and Patient Education Provided     Education provided:   - anatomy/physiology of pelvic floor use of lumbar support pillow when driving   Discussed progression of plan of care with patient; educated pt in activity modification; reviewed HEP with pt. Pt demonstrated and verbalized understanding of all instruction and was provided with a handout of HEP (see Patient Instructions).  - home exercise program review    Written Home Exercises Provided: Patient instructed to cont prior HEP.  Exercises were reviewed and Dennis was able to demonstrate them prior to the end of the session.  Dennis demonstrated good  understanding of the education provided.     See EMR under Patient Instructions for exercises provided prior visit.    Assessment     Education on the importance of lumbar/perineal support with prolonged driving as he has not been utilizing this for his job. Progressed exercises with poor hip activation and B knee valgus noted. Pt will continue to benefit from skilled outpatient physical therapy to address the deficits listed in the problem list box on initial evaluation, provide pt/family education and to maximize pt's level of independence in the home and community environment.       Dennis Is progressing well towards his goals.   Pt prognosis is Good.     Pt will continue to benefit from skilled outpatient physical therapy to address the deficits listed in the " problem list box on initial evaluation, provide pt/family education and to maximize pt's level of independence in the home and community environment.     Pt's spiritual, cultural and educational needs considered and pt agreeable to plan of care and goals.     Anticipated barriers to physical therapy: none    Goals:   Short Term Goals: 4 weeks      Pt to demonstrate proper diaphragmatic breathing to help with calming the nervous system in order to decrease pain and to improve abdominal wall and pelvic floor musculature extensibility. (PROGRESSING 2/10/2025 )  Pt to report being able to complete a half day at work without significant increase in pain to demonstrate a return towards prior level of function.  (PART MET 2/10/2025)  Pt to report minimal pain with palpation of abdominal wall due to improvement in soft tissue mobility from moderate to minimal restrictions. (MET 2/10/2025)  Pt to demonstrate proper positioning on commode with breathing techniques to decrease strain with BM to enable pt to feel empty after BM. (MET 2/10/2025)  Pt to demonstrate independence with performing bowel massage to help with gut motility.  (NOT MET 2/10/2025)  Pt to be able to bulge pelvic floor which is needed for comfortable BM and complete evacuation. ( MET 2/10/2025)        Long Term Goals: 12 weeks      Pt to be discharged with home plan for carry over after discharge.  (NOT MET 2/10/2025)  Pt will be trained and compliant with postural strategies in sitting and standing to improve alignment and decrease pain and muscle fatigue (PART MET 2/10/2025)  Patient will report a reduction in pelvic pain with sitting greater than 2 hours by at least 70% to demonstrate a return towards prior level of function   (PART MET 2/10/2025)  Pt to increase abdominal wall strength by at least 1 muscle grade to improve lumbopelvic stability with ADLs that would normally increase pain (PART MET 2/10/2025)       Plan     Plan of care Certification:  12/12/2024 to 3/7/2025.     Outpatient Physical Therapy 1-2 times weekly for 12 weeks     Next visit: mobility progressions and increased aerobic activity with hip stability. DC after last scheduled visit    Daina Erickson PT

## 2025-02-24 ENCOUNTER — OFFICE VISIT (OUTPATIENT)
Dept: UROLOGY | Facility: CLINIC | Age: 50
End: 2025-02-24
Payer: MEDICAID

## 2025-02-24 VITALS
HEIGHT: 72 IN | BODY MASS INDEX: 26.65 KG/M2 | DIASTOLIC BLOOD PRESSURE: 89 MMHG | SYSTOLIC BLOOD PRESSURE: 125 MMHG | WEIGHT: 196.75 LBS | HEART RATE: 77 BPM

## 2025-02-24 DIAGNOSIS — R79.89 LOW TESTOSTERONE IN MALE: ICD-10-CM

## 2025-02-24 DIAGNOSIS — N40.1 BPH WITH OBSTRUCTION/LOWER URINARY TRACT SYMPTOMS: ICD-10-CM

## 2025-02-24 DIAGNOSIS — N52.9 ERECTILE DYSFUNCTION, UNSPECIFIED ERECTILE DYSFUNCTION TYPE: ICD-10-CM

## 2025-02-24 DIAGNOSIS — R10.2 PELVIC PAIN: Primary | ICD-10-CM

## 2025-02-24 DIAGNOSIS — N13.8 BPH WITH OBSTRUCTION/LOWER URINARY TRACT SYMPTOMS: ICD-10-CM

## 2025-02-24 PROCEDURE — 1159F MED LIST DOCD IN RCRD: CPT | Mod: CPTII,,, | Performed by: STUDENT IN AN ORGANIZED HEALTH CARE EDUCATION/TRAINING PROGRAM

## 2025-02-24 PROCEDURE — 99999 PR PBB SHADOW E&M-EST. PATIENT-LVL III: CPT | Mod: PBBFAC,,, | Performed by: STUDENT IN AN ORGANIZED HEALTH CARE EDUCATION/TRAINING PROGRAM

## 2025-02-24 PROCEDURE — 99213 OFFICE O/P EST LOW 20 MIN: CPT | Mod: PBBFAC,PN | Performed by: STUDENT IN AN ORGANIZED HEALTH CARE EDUCATION/TRAINING PROGRAM

## 2025-02-24 PROCEDURE — 3074F SYST BP LT 130 MM HG: CPT | Mod: CPTII,,, | Performed by: STUDENT IN AN ORGANIZED HEALTH CARE EDUCATION/TRAINING PROGRAM

## 2025-02-24 PROCEDURE — 99215 OFFICE O/P EST HI 40 MIN: CPT | Mod: S$PBB,,, | Performed by: STUDENT IN AN ORGANIZED HEALTH CARE EDUCATION/TRAINING PROGRAM

## 2025-02-24 PROCEDURE — 3079F DIAST BP 80-89 MM HG: CPT | Mod: CPTII,,, | Performed by: STUDENT IN AN ORGANIZED HEALTH CARE EDUCATION/TRAINING PROGRAM

## 2025-02-24 PROCEDURE — 3008F BODY MASS INDEX DOCD: CPT | Mod: CPTII,,, | Performed by: STUDENT IN AN ORGANIZED HEALTH CARE EDUCATION/TRAINING PROGRAM

## 2025-02-24 RX ORDER — FINASTERIDE 5 MG/1
5 TABLET, FILM COATED ORAL DAILY
Qty: 90 TABLET | Refills: 3 | Status: SHIPPED | OUTPATIENT
Start: 2025-02-24 | End: 2026-02-24

## 2025-02-24 RX ORDER — TAMSULOSIN HYDROCHLORIDE 0.4 MG/1
0.4 CAPSULE ORAL 2 TIMES DAILY
Qty: 180 CAPSULE | Refills: 3 | Status: SHIPPED | OUTPATIENT
Start: 2025-02-24 | End: 2026-02-24

## 2025-02-24 RX ORDER — TESTOSTERONE CYPIONATE 200 MG/ML
100 INJECTION, SOLUTION INTRAMUSCULAR
Qty: 6 ML | Refills: 3 | Status: SHIPPED | OUTPATIENT
Start: 2025-02-24

## 2025-02-24 NOTE — PROGRESS NOTES
[facility] [address]   Clinic Note    SUBJECTIVE:     Chief Complaint: BPH with LUTS    History of Present Illness:  Dennis Oden is a 49 y.o. male who presents to clinic for BPH. He is new to our clinic referred by No ref. provider found. Referral from No ref. provider found.    He was seen in the ED 10/2/24 with dysuria and gross hematuria. UA was nitrite positive but was not sent for culture; he was discharged with Ceftin.   CT RSS shows no urolithiasis or hydronephrosis.    Went to urgent care 10/16 with similar symptoms, given Bactrim for LE+ urine.     On Flomax. AUASS today 26/6.   CT Urogram showed 24 cc prostate, no upper tract filling defects. Cystoscopy showed bilobar prostatic enlargment with slight high bladder neck.   Patient reports minimal improvement on Flomax, now complaining of pelvic pain and ED.     Patient has numerous questions and is difficult to engage; frequently interrupts, repeats questions that have been discussed.    At last visit, after much discussion, patient was started on Cialis, referred to pelvic floor PT. Flomax was increased to BID.  AM T x 2 were low, so he was started on Androgel (did not feel he could do injections at home).    Today:  Has been going to pelvic floor PT. Has some improvement in pain. Still having some LUTS on BID Flomax. Switched back to doing testosterone cypionate injections, last dose was 2 weeks ago (ran out.)      Anticoagulation:  No    OBJECTIVE:     Estimated body mass index is 26.69 kg/m² as calculated from the following:    Height as of this encounter: 6' (1.829 m).    Weight as of this encounter: 89.3 kg (196 lb 12.2 oz).    Vital Signs (Most Recent)  Pulse: 77 (02/24/25 1500)  BP: 125/89 (02/24/25 1500)    Physical Exam  Vitals reviewed.   Constitutional:       Appearance: Normal appearance.   HENT:      Head: Normocephalic and atraumatic.   Cardiovascular:      Rate and Rhythm: Normal rate.   Pulmonary:      Effort: Pulmonary effort is  "normal.   Abdominal:      General: Abdomen is flat.   Musculoskeletal:         General: Normal range of motion.   Skin:     General: Skin is warm and dry.   Neurological:      General: No focal deficit present.      Mental Status: He is alert and oriented to person, place, and time.   Psychiatric:         Mood and Affect: Mood normal.         Behavior: Behavior normal.         Thought Content: Thought content normal.         Judgment: Judgment normal.         Lab Results   Component Value Date    BUN 14 10/02/2024    CREATININE 0.9 10/02/2024    WBC 6.75 10/02/2024    HGB 14.0 10/02/2024    HCT 39.8 (L) 10/02/2024     10/02/2024    AST 15 10/02/2024    ALT 30 10/02/2024    ALKPHOS 74 10/02/2024    ALBUMIN 4.3 10/02/2024        No results found for: "PSA", "PSADIAG", "PSAFREE", "PSAFREEPCT", "PSARAT"        ASSESSMENT     1. Pelvic pain    2. Low testosterone in male    3. BPH with obstruction/lower urinary tract symptoms    4. Erectile dysfunction, unspecified erectile dysfunction type            PLAN:   1. Pelvic pain    2. Low testosterone in male  -     testosterone cypionate (DEPOTESTOTERONE CYPIONATE) 200 mg/mL injection; Inject 0.5 mLs (100 mg total) into the muscle every 7 days.  Dispense: 6 mL; Refill: 3    3. BPH with obstruction/lower urinary tract symptoms    4. Erectile dysfunction, unspecified erectile dysfunction type    Other orders  -     tamsulosin (FLOMAX) 0.4 mg Cap; Take 1 capsule (0.4 mg total) by mouth 2 (two) times a day.  Dispense: 180 capsule; Refill: 3  -     finasteride (PROSCAR) 5 mg tablet; Take 1 tablet (5 mg total) by mouth once daily.  Dispense: 90 tablet; Refill: 3          Continue Flomax BID and Cialis 5 mg daily. 90 day supply for both sent.   Continue PFPT.  Continue testosterone cypionate injections; refill sent, check labs 1 week after first injection.   Very lengthy discussion of patient's symptoms, possible options for management. Ultimately decided to add finasteride " to current medications. Rx sent.    Vazquez Acuna MD     I spent over 40 minutes on this visit including chart review, physical examination, discussion with patient, placement of orders, documentation, and communication with other healthcare professionals.

## 2025-02-26 ENCOUNTER — CLINICAL SUPPORT (OUTPATIENT)
Dept: REHABILITATION | Facility: HOSPITAL | Age: 50
End: 2025-02-26
Payer: MEDICAID

## 2025-02-26 DIAGNOSIS — M25.651 DECREASED RANGE OF MOTION OF BOTH HIPS: ICD-10-CM

## 2025-02-26 DIAGNOSIS — M25.652 DECREASED RANGE OF MOTION OF BOTH HIPS: ICD-10-CM

## 2025-02-26 DIAGNOSIS — M62.89 PELVIC FLOOR DYSFUNCTION: Primary | ICD-10-CM

## 2025-02-26 PROCEDURE — 97110 THERAPEUTIC EXERCISES: CPT | Mod: PO

## 2025-02-26 NOTE — PROGRESS NOTES
"  Pelvic Health Physical Therapy   Discharge Summary     Name: Dennis Oden  Clinic Number: [id number]    Therapy Diagnosis:   Encounter Diagnoses   Name Primary?    Pelvic floor dysfunction Yes    Decreased range of motion of both hips        Physician: Vazquez Acuna MD    Visit Date: 2/26/2025    Physician Orders: PT Eval and Treat   Medical Diagnosis from Referral:  Erectile dysfunction, unspecified erectile dysfunction type [N52.9], Pelvic pain [R10.2]   Evaluation Date: 12/12/2024  Authorization Period Expiration: 12-31-24  Plan of Care Expiration: 3/7/2025  Progress Note Due: 3/10/2025  Visit # / Visits authorized: 7/11 (8 total)      Precautions: Standard      Time In: 2:30 pm   Time Out: 3:53  pm  Total Appointment Time (timed & untimed codes): 83 minutes    Subjective     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 Has been doing his exercises and stretches, but does better when he is at therapy. He is going to get more serious about his exercise after Ramadan. He did order the lumbar support but this makes his head hit the top of the car and does not work. Exercise does help, but having to sit for hours and hold his bladder for hours makes this pain worse again. Reports the provided medication does not work.       He was somewhat compliant with home exercise program.   Response to previous treatment: as noted   Functional change: as noted     Pain: 2/10  Location: prostate    Objective     IR/ER: restricted significantly on L into both planes of motion (IR worse than ER)  Restricted moderately on R into both planes of motion    Great squat depth with appropriate hip hinge, although loss of lumbar lordosis present    Dennis received therapeutic exercises to develop  strength, endurance, ROM, flexibility, posture, and core stabilization for 80 minutes including:     Performed with diaphragmatic breathing:  Alternating adductor stretch in split stance 3 x 30"  Child's pose/prayer stretch " "into frog pose 3 x 1 minutes   Cat/cow 2 x 10   Dynamic hamstring stretch (sweeping) x 5 laps   Deep squatting 3 x 45"  [place] stretch 3 x 30"  Walking lunging 4 x 10, #25 KB  Kettle bell swings 4 x 8, #25  SL step up + lat pull down, blue theraband 2 x 10  Wall plank 30 sec x 3 demonstrated as easier alternative.   Jumping jacks 3 x 30"   Supine diaphragmatic breathing to regulate breath  Seated MARIO stretch 3 x 30"  Side lying clamshells 3 x 30" blue theraband   Squat jumping 3 x 10  Figure 4 IR/ER stretching 3 x 20"  Education on use of lumbar support pillow when driving AND [employer]/Yoga classes   Education on increased diaphragmatic breathing for more frequent pelvic relaxation and to ease symptoms     Not this date:    Monster walking fwd/bck/lateral x 4 laps each, green ankle band   Dead lifting 4 x 8, #25 KB  Quadruped hip extensions 3 x 10. 2"  green ankle band   Quadruped fire hydrants 4 x 20"  blue theraband  Single knee to chest 3 x 30 biased to abduction     Home Exercises Provided and Patient Education Provided     Education provided:   - anatomy/physiology of pelvic floor use of lumbar support pillow when driving   Discussed progression of plan of care with patient; educated pt in activity modification; reviewed HEP with pt. Pt demonstrated and verbalized understanding of all instruction and was provided with a handout of HEP (see Patient Instructions).  - home exercise program review    Written Home Exercises Provided: Patient instructed to cont prior HEP.  Exercises were reviewed and Dennis was able to demonstrate them prior to the end of the session.  Dennis demonstrated good  understanding of the education provided.     See EMR under Patient Instructions for exercises provided prior visit.    PHYSICAL THERAPY DISCHARGE SUMMARY     Status Towards Goals Met: Pt has met all short and long term goals. Inconsistencies with home program and appropriate lifestyle has made his pain come and go, but " it is better overall. Patient is independent with home program to continue progressing and plans to join yoga/exercise studios following cessation of Ramadan.     Goals:   Short Term Goals: 4 weeks      Pt to demonstrate proper diaphragmatic breathing to help with calming the nervous system in order to decrease pain and to improve abdominal wall and pelvic floor musculature extensibility.  ( MET 2/26/2025)  Pt to report being able to complete a half day at work without significant increase in pain to demonstrate a return towards prior level of function.  (MET 2/26/2025)  Pt to report minimal pain with palpation of abdominal wall due to improvement in soft tissue mobility from moderate to minimal restrictions. (MET 2/10/2025)  Pt to demonstrate proper positioning on commode with breathing techniques to decrease strain with BM to enable pt to feel empty after BM. (MET 2/10/2025)  Pt to demonstrate independence with performing bowel massage to help with gut motility.  (MET 2/26/2025)  Pt to be able to bulge pelvic floor which is needed for comfortable BM and complete evacuation. ( MET 2/10/2025)        Long Term Goals: 12 weeks      Pt to be discharged with home plan for carry over after discharge.  (MET 2/26/2025)  Pt will be trained and compliant with postural strategies in sitting and standing to improve alignment and decrease pain and muscle fatigue ( MET 2/26/2025) not consistent; but has been provided appropriate education at several visits  Patient will report a reduction in pelvic pain with sitting greater than 2 hours by at least 70% to demonstrate a return towards prior level of function   ( MET 2/26/2025)  Pt to increase abdominal wall strength by at least 1 muscle grade to improve lumbopelvic stability with ADLs that would normally increase pain  ( MET 2/26/2025)      Discharge reason : Patient has maximized benefit from PT at this time    PLAN   This patient is discharged from Outpatient Physical Therapy  Services.     Daina Erickson, PT  02/26/2025

## 2025-04-03 ENCOUNTER — TELEPHONE (OUTPATIENT)
Dept: UROLOGY | Facility: CLINIC | Age: 50
End: 2025-04-03
Payer: MEDICAID

## 2025-06-09 ENCOUNTER — TELEPHONE (OUTPATIENT)
Dept: UROLOGY | Facility: CLINIC | Age: 50
End: 2025-06-09
Payer: MEDICAID

## 2025-06-25 DIAGNOSIS — R79.89 LOW TESTOSTERONE IN MALE: Primary | ICD-10-CM

## 2025-07-29 DIAGNOSIS — M62.89 PELVIC FLOOR DYSFUNCTION: Primary | ICD-10-CM

## 2025-07-29 DIAGNOSIS — R10.2 PELVIC PAIN: ICD-10-CM
